# Patient Record
Sex: FEMALE | Race: OTHER
[De-identification: names, ages, dates, MRNs, and addresses within clinical notes are randomized per-mention and may not be internally consistent; named-entity substitution may affect disease eponyms.]

---

## 2023-01-25 ENCOUNTER — HOSPITAL ENCOUNTER (INPATIENT)
Dept: HOSPITAL 15 - ER | Age: 63
LOS: 1 days | Discharge: HOME | DRG: 948 | End: 2023-01-26
Attending: FAMILY MEDICINE | Admitting: NURSE PRACTITIONER
Payer: COMMERCIAL

## 2023-01-25 VITALS — BODY MASS INDEX: 32.43 KG/M2 | HEIGHT: 65 IN | WEIGHT: 194.67 LBS

## 2023-01-25 VITALS — DIASTOLIC BLOOD PRESSURE: 67 MMHG | SYSTOLIC BLOOD PRESSURE: 134 MMHG

## 2023-01-25 DIAGNOSIS — T45.515A: ICD-10-CM

## 2023-01-25 DIAGNOSIS — Z20.822: ICD-10-CM

## 2023-01-25 DIAGNOSIS — R79.9: Primary | ICD-10-CM

## 2023-01-25 DIAGNOSIS — Z79.01: ICD-10-CM

## 2023-01-25 DIAGNOSIS — I10: ICD-10-CM
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
## 2023-01-25 DIAGNOSIS — Z86.711: ICD-10-CM

## 2023-01-25 LAB
ALBUMIN SERPL-MCNC: 3.6 G/DL (ref 3.4–5)
ALP SERPL-CCNC: 81 U/L (ref 45–117)
ALT SERPL-CCNC: 23 U/L (ref 13–56)
ANION GAP SERPL CALCULATED.3IONS-SCNC: 9 MMOL/L (ref 5–15)
BILIRUB SERPL-MCNC: 0.4 MG/DL (ref 0.2–1)
BUN SERPL-MCNC: 20 MG/DL (ref 7–18)
BUN/CREAT SERPL: 16.9
CALCIUM SERPL-MCNC: 9.2 MG/DL (ref 8.5–10.1)
CHLORIDE SERPL-SCNC: 105 MMOL/L (ref 98–107)
CO2 SERPL-SCNC: 28 MMOL/L (ref 21–32)
GLUCOSE SERPL-MCNC: 134 MG/DL (ref 74–106)
HCT VFR BLD AUTO: 44.5 % (ref 36–46)
HGB BLD-MCNC: 14.8 G/DL (ref 12.2–16.2)
INR PPP: 6.8 (ref 0.9–1.15)
MCH RBC QN AUTO: 33.6 PG (ref 28–32)
MCV RBC AUTO: 100.6 FL (ref 80–100)
NRBC BLD QL AUTO: 0.1 %
POTASSIUM SERPL-SCNC: 3.7 MMOL/L (ref 3.5–5.1)
PROT SERPL-MCNC: 7.2 G/DL (ref 6.4–8.2)
SODIUM SERPL-SCNC: 142 MMOL/L (ref 136–145)

## 2023-01-25 PROCEDURE — 85025 COMPLETE CBC W/AUTO DIFF WBC: CPT

## 2023-01-25 PROCEDURE — 85730 THROMBOPLASTIN TIME PARTIAL: CPT

## 2023-01-25 PROCEDURE — 36415 COLL VENOUS BLD VENIPUNCTURE: CPT

## 2023-01-25 PROCEDURE — 87426 SARSCOV CORONAVIRUS AG IA: CPT

## 2023-01-25 PROCEDURE — 85610 PROTHROMBIN TIME: CPT

## 2023-01-25 PROCEDURE — 80053 COMPREHEN METABOLIC PANEL: CPT

## 2023-01-25 PROCEDURE — 80048 BASIC METABOLIC PNL TOTAL CA: CPT

## 2023-01-26 VITALS — SYSTOLIC BLOOD PRESSURE: 135 MMHG | DIASTOLIC BLOOD PRESSURE: 72 MMHG

## 2023-01-26 LAB
ANION GAP SERPL CALCULATED.3IONS-SCNC: 7 MMOL/L (ref 5–15)
APTT PPP: 39.2 SEC (ref 24.6–33.4)
BUN SERPL-MCNC: 22 MG/DL (ref 7–18)
BUN/CREAT SERPL: 19.6
CALCIUM SERPL-MCNC: 9.2 MG/DL (ref 8.5–10.1)
CHLORIDE SERPL-SCNC: 105 MMOL/L (ref 98–107)
CO2 SERPL-SCNC: 29 MMOL/L (ref 21–32)
GLUCOSE SERPL-MCNC: 94 MG/DL (ref 74–106)
HCT VFR BLD AUTO: 46.2 % (ref 36–46)
HGB BLD-MCNC: 15.1 G/DL (ref 12.2–16.2)
INR PPP: 2.12 (ref 0.9–1.15)
MCH RBC QN AUTO: 33.2 PG (ref 28–32)
MCV RBC AUTO: 101.1 FL (ref 80–100)
NRBC BLD QL AUTO: 0.1 %
POTASSIUM SERPL-SCNC: 3.4 MMOL/L (ref 3.5–5.1)
SODIUM SERPL-SCNC: 141 MMOL/L (ref 136–145)

## 2023-01-26 RX ADMIN — ACETAMINOPHEN PRN MG: 325 TABLET ORAL at 01:06

## 2023-01-26 RX ADMIN — ACETAMINOPHEN PRN MG: 325 TABLET ORAL at 09:20

## 2024-10-26 ENCOUNTER — HOSPITAL ENCOUNTER (INPATIENT)
Dept: HOSPITAL 15 - ER | Age: 64
LOS: 2 days | Discharge: HOME | DRG: 202 | End: 2024-10-28
Admitting: NURSE PRACTITIONER
Payer: COMMERCIAL

## 2024-10-26 VITALS
OXYGEN SATURATION: 91 % | TEMPERATURE: 97.7 F | SYSTOLIC BLOOD PRESSURE: 123 MMHG | HEART RATE: 81 BPM | DIASTOLIC BLOOD PRESSURE: 74 MMHG | RESPIRATION RATE: 20 BRPM

## 2024-10-26 VITALS — RESPIRATION RATE: 20 BRPM | OXYGEN SATURATION: 95 % | HEART RATE: 79 BPM

## 2024-10-26 VITALS
SYSTOLIC BLOOD PRESSURE: 138 MMHG | DIASTOLIC BLOOD PRESSURE: 58 MMHG | TEMPERATURE: 98.2 F | RESPIRATION RATE: 19 BRPM | HEART RATE: 96 BPM | OXYGEN SATURATION: 87 %

## 2024-10-26 VITALS — RESPIRATION RATE: 18 BRPM | HEART RATE: 95 BPM | OXYGEN SATURATION: 96 %

## 2024-10-26 VITALS — OXYGEN SATURATION: 98 %

## 2024-10-26 VITALS
TEMPERATURE: 98.1 F | HEART RATE: 95 BPM | OXYGEN SATURATION: 96 % | RESPIRATION RATE: 18 BRPM | DIASTOLIC BLOOD PRESSURE: 72 MMHG | SYSTOLIC BLOOD PRESSURE: 126 MMHG

## 2024-10-26 VITALS — HEIGHT: 65 IN | BODY MASS INDEX: 35.22 KG/M2 | WEIGHT: 211.42 LBS

## 2024-10-26 DIAGNOSIS — N17.9: ICD-10-CM

## 2024-10-26 DIAGNOSIS — Z86.711: ICD-10-CM

## 2024-10-26 DIAGNOSIS — I95.9: ICD-10-CM

## 2024-10-26 DIAGNOSIS — Z79.899: ICD-10-CM

## 2024-10-26 DIAGNOSIS — F32.A: ICD-10-CM

## 2024-10-26 DIAGNOSIS — K21.9: ICD-10-CM

## 2024-10-26 DIAGNOSIS — J45.41: Primary | ICD-10-CM

## 2024-10-26 DIAGNOSIS — I10: ICD-10-CM

## 2024-10-26 DIAGNOSIS — G43.909: ICD-10-CM

## 2024-10-26 LAB
ALBUMIN SERPL-MCNC: 4.1 G/DL (ref 3.2–4.8)
ALP SERPL-CCNC: 72 U/L (ref 46–116)
ALT SERPL-CCNC: 15 U/L (ref 7–40)
ANION GAP SERPL CALCULATED.3IONS-SCNC: 6 MMOL/L (ref 5–15)
BILIRUB SERPL-MCNC: 0.6 MG/DL (ref 0.2–1)
BUN SERPL-MCNC: 11 MG/DL (ref 9–23)
BUN/CREAT SERPL: 10.2 (ref 10–20)
CALCIUM SERPL-MCNC: 10.1 MG/DL (ref 8.7–10.4)
CHLORIDE SERPL-SCNC: 103 MMOL/L (ref 98–107)
CO2 SERPL-SCNC: 30 MMOL/L (ref 20–31)
GLUCOSE SERPL-MCNC: 112 MG/DL (ref 74–106)
HCT VFR BLD AUTO: 42.2 % (ref 36–46)
HGB BLD-MCNC: 14.1 G/DL (ref 12.2–16.2)
MAGNESIUM SERPL-MCNC: 1.8 MG/DL (ref 1.6–2.6)
MCH RBC QN AUTO: 33.2 PG (ref 28–32)
MCV RBC AUTO: 99.6 FL (ref 80–100)
NRBC BLD QL AUTO: 0.1 %
POTASSIUM SERPL-SCNC: 3.6 MMOL/L (ref 3.5–5.1)
PROT SERPL-MCNC: 7.1 G/DL (ref 5.7–8.2)
PROT UR STRIP.AUTO-MCNC: (no result) MG/DL
SODIUM SERPL-SCNC: 139 MMOL/L (ref 136–145)

## 2024-10-26 PROCEDURE — 81001 URINALYSIS AUTO W/SCOPE: CPT

## 2024-10-26 PROCEDURE — 94640 AIRWAY INHALATION TREATMENT: CPT

## 2024-10-26 PROCEDURE — 71046 X-RAY EXAM CHEST 2 VIEWS: CPT

## 2024-10-26 PROCEDURE — 83735 ASSAY OF MAGNESIUM: CPT

## 2024-10-26 PROCEDURE — 36415 COLL VENOUS BLD VENIPUNCTURE: CPT

## 2024-10-26 PROCEDURE — 93005 ELECTROCARDIOGRAM TRACING: CPT

## 2024-10-26 PROCEDURE — 85025 COMPLETE CBC W/AUTO DIFF WBC: CPT

## 2024-10-26 PROCEDURE — 80053 COMPREHEN METABOLIC PANEL: CPT

## 2024-10-26 PROCEDURE — 70450 CT HEAD/BRAIN W/O DYE: CPT

## 2024-10-26 RX ADMIN — KETOROLAC TROMETHAMINE ONE MG: 30 INJECTION, SOLUTION INTRAMUSCULAR; INTRAVENOUS at 10:35

## 2024-10-26 RX ADMIN — METHYLPREDNISOLONE SODIUM SUCCINATE ONE MG: 125 INJECTION, POWDER, FOR SOLUTION INTRAMUSCULAR; INTRAVENOUS at 13:23

## 2024-10-26 RX ADMIN — ALBUTEROL SULFATE ONE MG: 2.5 SOLUTION RESPIRATORY (INHALATION) at 13:28

## 2024-10-26 RX ADMIN — IPRATROPIUM BROMIDE ONE MG: 0.5 SOLUTION RESPIRATORY (INHALATION) at 13:28

## 2024-10-26 RX ADMIN — SODIUM CHLORIDE ONE MLS/HR: 0.9 INJECTION, SOLUTION INTRAVENOUS at 11:20

## 2024-10-26 RX ADMIN — METOCLOPRAMIDE ONE MG: 5 INJECTION, SOLUTION INTRAMUSCULAR; INTRAVENOUS at 10:36

## 2024-10-26 RX ADMIN — METHYLPREDNISOLONE SODIUM SUCCINATE SCH MG: 40 INJECTION, POWDER, FOR SOLUTION INTRAMUSCULAR; INTRAVENOUS at 22:00

## 2024-10-26 RX ADMIN — SODIUM CHLORIDE SCH ML: 9 INJECTION INTRAMUSCULAR; INTRAVENOUS; SUBCUTANEOUS at 22:01

## 2024-10-26 RX ADMIN — HYDROCODONE BITARTRATE AND ACETAMINOPHEN PRN TAB: 5; 325 TABLET ORAL at 22:01

## 2024-10-26 RX ADMIN — SODIUM CHLORIDE ONE MLS/HR: 0.9 INJECTION, SOLUTION INTRAVENOUS at 10:36

## 2024-10-27 VITALS
RESPIRATION RATE: 18 BRPM | HEART RATE: 105 BPM | OXYGEN SATURATION: 92 % | DIASTOLIC BLOOD PRESSURE: 64 MMHG | SYSTOLIC BLOOD PRESSURE: 123 MMHG | TEMPERATURE: 97.9 F

## 2024-10-27 VITALS
TEMPERATURE: 97.8 F | OXYGEN SATURATION: 92 % | RESPIRATION RATE: 17 BRPM | DIASTOLIC BLOOD PRESSURE: 67 MMHG | HEART RATE: 93 BPM | SYSTOLIC BLOOD PRESSURE: 118 MMHG

## 2024-10-27 VITALS — HEART RATE: 99 BPM | OXYGEN SATURATION: 95 % | RESPIRATION RATE: 20 BRPM

## 2024-10-27 VITALS
RESPIRATION RATE: 20 BRPM | OXYGEN SATURATION: 92 % | DIASTOLIC BLOOD PRESSURE: 77 MMHG | HEART RATE: 101 BPM | TEMPERATURE: 98.2 F | SYSTOLIC BLOOD PRESSURE: 127 MMHG

## 2024-10-27 VITALS
OXYGEN SATURATION: 94 % | SYSTOLIC BLOOD PRESSURE: 131 MMHG | TEMPERATURE: 98.4 F | RESPIRATION RATE: 20 BRPM | DIASTOLIC BLOOD PRESSURE: 63 MMHG | HEART RATE: 90 BPM

## 2024-10-27 VITALS — OXYGEN SATURATION: 94 % | HEART RATE: 101 BPM | RESPIRATION RATE: 18 BRPM

## 2024-10-27 VITALS
HEART RATE: 87 BPM | SYSTOLIC BLOOD PRESSURE: 127 MMHG | TEMPERATURE: 98.3 F | OXYGEN SATURATION: 92 % | RESPIRATION RATE: 17 BRPM | DIASTOLIC BLOOD PRESSURE: 69 MMHG

## 2024-10-27 VITALS — HEART RATE: 99 BPM | RESPIRATION RATE: 18 BRPM | OXYGEN SATURATION: 98 %

## 2024-10-27 VITALS
RESPIRATION RATE: 20 BRPM | SYSTOLIC BLOOD PRESSURE: 136 MMHG | DIASTOLIC BLOOD PRESSURE: 71 MMHG | HEART RATE: 101 BPM | OXYGEN SATURATION: 88 % | TEMPERATURE: 98.4 F

## 2024-10-27 VITALS — HEART RATE: 101 BPM | RESPIRATION RATE: 20 BRPM | OXYGEN SATURATION: 96 %

## 2024-10-27 VITALS — OXYGEN SATURATION: 95 %

## 2024-10-27 LAB
ALBUMIN SERPL-MCNC: 4 G/DL (ref 3.2–4.8)
ALP SERPL-CCNC: 67 U/L (ref 46–116)
ALT SERPL-CCNC: 11 U/L (ref 7–40)
ANION GAP SERPL CALCULATED.3IONS-SCNC: 7 MMOL/L (ref 5–15)
BILIRUB SERPL-MCNC: 0.5 MG/DL (ref 0.2–1)
BUN SERPL-MCNC: 19 MG/DL (ref 9–23)
BUN/CREAT SERPL: 18.6 (ref 10–20)
CALCIUM SERPL-MCNC: 9.7 MG/DL (ref 8.7–10.4)
CHLORIDE SERPL-SCNC: 104 MMOL/L (ref 98–107)
CO2 SERPL-SCNC: 28 MMOL/L (ref 20–31)
GLUCOSE SERPL-MCNC: 143 MG/DL (ref 74–106)
HCT VFR BLD AUTO: 41.5 % (ref 36–46)
HGB BLD-MCNC: 13.8 G/DL (ref 12.2–16.2)
MCH RBC QN AUTO: 33.1 PG (ref 28–32)
MCV RBC AUTO: 99.6 FL (ref 80–100)
NRBC BLD QL AUTO: 0.1 %
POTASSIUM SERPL-SCNC: 4.1 MMOL/L (ref 3.5–5.1)
PROT SERPL-MCNC: 6.8 G/DL (ref 5.7–8.2)
SODIUM SERPL-SCNC: 139 MMOL/L (ref 136–145)

## 2024-10-27 RX ADMIN — FAMOTIDINE ONE MG: 20 TABLET, FILM COATED ORAL at 16:11

## 2024-10-27 RX ADMIN — IPRATROPIUM BROMIDE PRN MG: 0.5 SOLUTION RESPIRATORY (INHALATION) at 20:26

## 2024-10-27 RX ADMIN — MONTELUKAST SODIUM SCH MG: 10 TABLET, FILM COATED ORAL at 21:12

## 2024-10-27 RX ADMIN — ENOXAPARIN SODIUM SCH MG: 100 INJECTION SUBCUTANEOUS at 21:14

## 2024-10-27 RX ADMIN — ALBUTEROL SULFATE PRN MG: 2.5 SOLUTION RESPIRATORY (INHALATION) at 20:26

## 2024-10-28 VITALS
SYSTOLIC BLOOD PRESSURE: 133 MMHG | OXYGEN SATURATION: 93 % | RESPIRATION RATE: 19 BRPM | HEART RATE: 98 BPM | TEMPERATURE: 98.1 F | DIASTOLIC BLOOD PRESSURE: 68 MMHG

## 2024-10-28 VITALS — RESPIRATION RATE: 20 BRPM | HEART RATE: 94 BPM | OXYGEN SATURATION: 94 %

## 2024-10-28 VITALS
HEART RATE: 92 BPM | SYSTOLIC BLOOD PRESSURE: 127 MMHG | TEMPERATURE: 97.9 F | DIASTOLIC BLOOD PRESSURE: 84 MMHG | RESPIRATION RATE: 18 BRPM | OXYGEN SATURATION: 94 %

## 2024-10-28 VITALS
HEART RATE: 94 BPM | OXYGEN SATURATION: 92 % | RESPIRATION RATE: 17 BRPM | TEMPERATURE: 98.2 F | DIASTOLIC BLOOD PRESSURE: 66 MMHG | SYSTOLIC BLOOD PRESSURE: 122 MMHG

## 2024-10-28 VITALS
SYSTOLIC BLOOD PRESSURE: 137 MMHG | TEMPERATURE: 98.2 F | OXYGEN SATURATION: 92 % | HEART RATE: 94 BPM | DIASTOLIC BLOOD PRESSURE: 75 MMHG | RESPIRATION RATE: 17 BRPM

## 2024-10-28 VITALS — OXYGEN SATURATION: 96 % | HEART RATE: 101 BPM | RESPIRATION RATE: 18 BRPM

## 2024-10-28 VITALS
OXYGEN SATURATION: 90 % | DIASTOLIC BLOOD PRESSURE: 75 MMHG | SYSTOLIC BLOOD PRESSURE: 137 MMHG | RESPIRATION RATE: 16 BRPM | HEART RATE: 91 BPM | TEMPERATURE: 97.8 F

## 2024-10-28 VITALS — OXYGEN SATURATION: 100 % | RESPIRATION RATE: 18 BRPM | HEART RATE: 88 BPM

## 2024-10-28 VITALS — OXYGEN SATURATION: 95 %

## 2024-10-28 RX ADMIN — FAMOTIDINE SCH MG: 20 TABLET, FILM COATED ORAL at 09:51

## 2024-11-26 ENCOUNTER — HOSPITAL ENCOUNTER (INPATIENT)
Dept: HOSPITAL 15 - ER | Age: 64
LOS: 14 days | Discharge: HOME | DRG: 270 | End: 2024-12-10
Attending: INTERNAL MEDICINE | Admitting: INTERNAL MEDICINE
Payer: COMMERCIAL

## 2024-11-26 VITALS — DIASTOLIC BLOOD PRESSURE: 72 MMHG | OXYGEN SATURATION: 94 % | HEART RATE: 93 BPM | SYSTOLIC BLOOD PRESSURE: 117 MMHG

## 2024-11-26 VITALS — HEART RATE: 95 BPM | RESPIRATION RATE: 23 BRPM | OXYGEN SATURATION: 90 %

## 2024-11-26 VITALS — WEIGHT: 201.72 LBS | HEIGHT: 65 IN | BODY MASS INDEX: 33.61 KG/M2

## 2024-11-26 VITALS — HEART RATE: 85 BPM | OXYGEN SATURATION: 96 % | RESPIRATION RATE: 14 BRPM

## 2024-11-26 VITALS — RESPIRATION RATE: 18 BRPM | OXYGEN SATURATION: 100 %

## 2024-11-26 VITALS — SYSTOLIC BLOOD PRESSURE: 116 MMHG | OXYGEN SATURATION: 97 % | DIASTOLIC BLOOD PRESSURE: 69 MMHG | HEART RATE: 92 BPM

## 2024-11-26 VITALS — SYSTOLIC BLOOD PRESSURE: 111 MMHG | DIASTOLIC BLOOD PRESSURE: 68 MMHG | HEART RATE: 85 BPM | OXYGEN SATURATION: 95 %

## 2024-11-26 VITALS
SYSTOLIC BLOOD PRESSURE: 111 MMHG | HEART RATE: 85 BPM | RESPIRATION RATE: 22 BRPM | OXYGEN SATURATION: 95 % | DIASTOLIC BLOOD PRESSURE: 75 MMHG

## 2024-11-26 DIAGNOSIS — E88.810: ICD-10-CM

## 2024-11-26 DIAGNOSIS — J96.21: ICD-10-CM

## 2024-11-26 DIAGNOSIS — Z20.822: ICD-10-CM

## 2024-11-26 DIAGNOSIS — Z86.711: ICD-10-CM

## 2024-11-26 DIAGNOSIS — N18.9: ICD-10-CM

## 2024-11-26 DIAGNOSIS — F32.A: ICD-10-CM

## 2024-11-26 DIAGNOSIS — E78.5: ICD-10-CM

## 2024-11-26 DIAGNOSIS — I07.1: ICD-10-CM

## 2024-11-26 DIAGNOSIS — J45.901: ICD-10-CM

## 2024-11-26 DIAGNOSIS — D75.89: ICD-10-CM

## 2024-11-26 DIAGNOSIS — I21.A1: ICD-10-CM

## 2024-11-26 DIAGNOSIS — I13.0: Primary | ICD-10-CM

## 2024-11-26 DIAGNOSIS — J98.11: ICD-10-CM

## 2024-11-26 DIAGNOSIS — Z87.891: ICD-10-CM

## 2024-11-26 DIAGNOSIS — Z79.01: ICD-10-CM

## 2024-11-26 DIAGNOSIS — J18.9: ICD-10-CM

## 2024-11-26 DIAGNOSIS — N17.0: ICD-10-CM

## 2024-11-26 DIAGNOSIS — I50.31: ICD-10-CM

## 2024-11-26 DIAGNOSIS — R73.9: ICD-10-CM

## 2024-11-26 DIAGNOSIS — I26.99: ICD-10-CM

## 2024-11-26 DIAGNOSIS — E66.01: ICD-10-CM

## 2024-11-26 DIAGNOSIS — J44.0: ICD-10-CM

## 2024-11-26 LAB
ANION GAP SERPL CALCULATED.3IONS-SCNC: 10 MMOL/L (ref 5–15)
BUN SERPL-MCNC: 29 MG/DL (ref 9–23)
BUN/CREAT SERPL: 16.1 (ref 10–20)
CALCIUM SERPL-MCNC: 9.5 MG/DL (ref 8.7–10.4)
CHLORIDE SERPL-SCNC: 106 MMOL/L (ref 98–107)
CO2 SERPL-SCNC: 26 MMOL/L (ref 20–31)
GLUCOSE SERPL-MCNC: 153 MG/DL (ref 74–106)
HCT VFR BLD AUTO: 43.7 % (ref 36–46)
HGB BLD-MCNC: 14.5 G/DL (ref 12.2–16.2)
MCH RBC QN AUTO: 33.8 PG (ref 28–32)
MCV RBC AUTO: 101.7 FL (ref 80–100)
NRBC BLD QL AUTO: 1.2 %
POTASSIUM SERPL-SCNC: 3.7 MMOL/L (ref 3.5–5.1)
SODIUM SERPL-SCNC: 142 MMOL/L (ref 136–145)

## 2024-11-26 PROCEDURE — 86900 BLOOD TYPING SEROLOGIC ABO: CPT

## 2024-11-26 PROCEDURE — 85007 BL SMEAR W/DIFF WBC COUNT: CPT

## 2024-11-26 PROCEDURE — 97116 GAIT TRAINING THERAPY: CPT

## 2024-11-26 PROCEDURE — 86850 RBC ANTIBODY SCREEN: CPT

## 2024-11-26 PROCEDURE — 97110 THERAPEUTIC EXERCISES: CPT

## 2024-11-26 PROCEDURE — 87804 INFLUENZA ASSAY W/OPTIC: CPT

## 2024-11-26 PROCEDURE — 94640 AIRWAY INHALATION TREATMENT: CPT

## 2024-11-26 PROCEDURE — 83880 ASSAY OF NATRIURETIC PEPTIDE: CPT

## 2024-11-26 PROCEDURE — 36600 WITHDRAWAL OF ARTERIAL BLOOD: CPT

## 2024-11-26 PROCEDURE — 81001 URINALYSIS AUTO W/SCOPE: CPT

## 2024-11-26 PROCEDURE — 71045 X-RAY EXAM CHEST 1 VIEW: CPT

## 2024-11-26 PROCEDURE — 87426 SARSCOV CORONAVIRUS AG IA: CPT

## 2024-11-26 PROCEDURE — 83036 HEMOGLOBIN GLYCOSYLATED A1C: CPT

## 2024-11-26 PROCEDURE — 71275 CT ANGIOGRAPHY CHEST: CPT

## 2024-11-26 PROCEDURE — 93306 TTE W/DOPPLER COMPLETE: CPT

## 2024-11-26 PROCEDURE — 93970 EXTREMITY STUDY: CPT

## 2024-11-26 PROCEDURE — 84484 ASSAY OF TROPONIN QUANT: CPT

## 2024-11-26 PROCEDURE — 83735 ASSAY OF MAGNESIUM: CPT

## 2024-11-26 PROCEDURE — 36415 COLL VENOUS BLD VENIPUNCTURE: CPT

## 2024-11-26 PROCEDURE — 82805 BLOOD GASES W/O2 SATURATION: CPT

## 2024-11-26 PROCEDURE — 87081 CULTURE SCREEN ONLY: CPT

## 2024-11-26 PROCEDURE — 80061 LIPID PANEL: CPT

## 2024-11-26 PROCEDURE — 99291 CRITICAL CARE FIRST HOUR: CPT

## 2024-11-26 PROCEDURE — 84439 ASSAY OF FREE THYROXINE: CPT

## 2024-11-26 PROCEDURE — 80048 BASIC METABOLIC PNL TOTAL CA: CPT

## 2024-11-26 PROCEDURE — 93451 RIGHT HEART CATH: CPT

## 2024-11-26 PROCEDURE — 85025 COMPLETE CBC W/AUTO DIFF WBC: CPT

## 2024-11-26 PROCEDURE — 99152 MOD SED SAME PHYS/QHP 5/>YRS: CPT

## 2024-11-26 PROCEDURE — 85027 COMPLETE CBC AUTOMATED: CPT

## 2024-11-26 PROCEDURE — 80053 COMPREHEN METABOLIC PANEL: CPT

## 2024-11-26 PROCEDURE — 94660 CPAP INITIATION&MGMT: CPT

## 2024-11-26 PROCEDURE — 70450 CT HEAD/BRAIN W/O DYE: CPT

## 2024-11-26 PROCEDURE — 37184 PRIM ART M-THRMBC 1ST VSL: CPT

## 2024-11-26 PROCEDURE — 85610 PROTHROMBIN TIME: CPT

## 2024-11-26 PROCEDURE — 93005 ELECTROCARDIOGRAM TRACING: CPT

## 2024-11-26 PROCEDURE — 97163 PT EVAL HIGH COMPLEX 45 MIN: CPT

## 2024-11-26 PROCEDURE — 82962 GLUCOSE BLOOD TEST: CPT

## 2024-11-26 PROCEDURE — 84443 ASSAY THYROID STIM HORMONE: CPT

## 2024-11-26 PROCEDURE — 93568 NJX CAR CTH NSLC P-ART ANGRP: CPT

## 2024-11-26 PROCEDURE — 85730 THROMBOPLASTIN TIME PARTIAL: CPT

## 2024-11-26 PROCEDURE — 86901 BLOOD TYPING SEROLOGIC RH(D): CPT

## 2024-11-26 RX ADMIN — IPRATROPIUM BROMIDE ONE MG: 0.5 SOLUTION RESPIRATORY (INHALATION) at 15:31

## 2024-11-26 RX ADMIN — Medication SCH STRIP: at 20:20

## 2024-11-26 RX ADMIN — ALBUTEROL SULFATE ONE MG: 2.5 SOLUTION RESPIRATORY (INHALATION) at 15:30

## 2024-11-26 RX ADMIN — AZITHROMYCIN DIHYDRATE ONE MLS/HR: 500 INJECTION, POWDER, LYOPHILIZED, FOR SOLUTION INTRAVENOUS at 19:27

## 2024-11-26 RX ADMIN — HUMAN INSULIN SCH UNITS: 100 INJECTION, SOLUTION SUBCUTANEOUS at 20:00

## 2024-11-26 RX ADMIN — METHYLPREDNISOLONE SODIUM SUCCINATE SCH MG: 40 INJECTION, POWDER, FOR SOLUTION INTRAMUSCULAR; INTRAVENOUS at 22:18

## 2024-11-26 RX ADMIN — ALBUTEROL SULFATE SCH MG: 2.5 SOLUTION RESPIRATORY (INHALATION) at 22:23

## 2024-11-26 RX ADMIN — ATORVASTATIN CALCIUM SCH MG: 20 TABLET, FILM COATED ORAL at 22:18

## 2024-11-26 RX ADMIN — CEFTRIAXONE SODIUM ONE MLS/HR: 1 INJECTION, POWDER, FOR SOLUTION INTRAMUSCULAR; INTRAVENOUS at 19:06

## 2024-11-26 NOTE — DVH
EXAM: CT HEAD WITHOUT CONTRAST



INDICATION: R/O head trauma



TECHNIQUE:  CT of the head without intravenous contrast.



Radiation Dose Information:



CT Dose: CTDI volume is 64.38 mGy. Dose-length product is 1268.55 mGy*cm



The dose indicators for CT are the volume Computed Tomography (CT) Dose Index (CTDIvol) and the Dose 
Length Product (DLP), and are measured in units of mGy and mGy-cm, respectively. These indicators are
 not patient dose, but values generated from the CT scanner acquisition factors. The report includes 
radiation exposure data for exposures received during this examination.  



COMPARISON: CT HEAD WITHOUT CONTRAST on DOS: 10/26/24



FINDINGS:



There is no evidence of acute intracranial hemorrhage, extra-axial collection, mass effect, midline s
hift, herniation or hydrocephalus. Bilateral idiopathic basal ganglion calcifications. These appear u
nchanged from 10/26/2024 



The ventricles, sulci and cisterns are age appropriate.



The gray-white differentiation is intact. 



Patchy periventricular and subcortical white matter hypoattenuation is nonspecific but may be related
 to small vessel ischemic disease.



The visualized paranasal sinuses and mastoid air cells are clear. 



The surrounding soft tissues and osseous structures are unremarkable.



IMPRESSION:



1. No acute intracranial hemorrhage.



2. No CT findings of territorial ischemia.



3. No CT findings of displaced skull fracture.



 



Electronically Signed by: Jorge Vaca at 11/26/2024 20:28:53 PM

## 2024-11-26 NOTE — DVHHP2
History of Present Illness


Reason for Visit:  Shortness of breath


History of Present Illness


Ryan Baptiste is a 65YO F with pmHx of Asthma and HTN who presents to the ED

for shortness of breath. Patient was placed on NRB for sats in the high 80s then

placed on NRB for increased WOB. Patient reports she was camping 5 days ago and 

her shortness of breath occurred before the camping trip in [facility]. Patient's 

 reports that he found her face down on the floor but was conscious. 

Patient reports she is frequently in and out of the hospital for her asthma. 

Patient reports she does not use oxygen at home. She is compliant with her 

medications. Family at the bedside. Patient report her breathing is better. 

Patient denies chest pain, fever, chills, abdominal pain, and weakness.


Cardiovascular:  HTN


Pulmonary:  Asthma


Past Surgical History:  


Family History:  None


Smoke:  No


ALCOHOL:  none


Drugs:  None


Lives:  with Family


Domestic Violence:  Neg





Review of Systems


Constitutional:  No: Fever, Chills, Sweats, Weakness, Malaise, Other


Eyes:  No: Pain, Vision change, Conjunctivae inflammation, Eyelid inflammation, 

Other, Redness


ENT:  No: Ear pain, Ear discharge, Nose pain, Nose discharge, Nose congestion, 

Mouth pain, Mouth swelling, Throat pain, Throat swelling, Other


Respiratory:  Shortness of breath; No: Cough, Dry, SOB with excertion, Wheezing,

Hemoptysis, Pleuritic Pain, Sputum, Wheezing, Other


Cardiovascular:  Other (chest tightness); No: Chest Pain, Palpitations, 

Orthopnea, Paroxysmal Noc. Dyspnea, Edema, Lt Headedness


Gastrointestinal:  No: Nausea, Vomiting, Abdominal Pain, Diarrhea, Constipation,

Melena, Hematochezia, Other


Genitourinary:  No Dysuria, No Frequency, No Incontinence, No Hematuria, No 

Retention, No Other


Musculoskeletal:  No: other, neck pain, shoulder pain, arm pain, back pain, hand

pain, leg pain, foot pain


Skin:  No: Rash, Lesions, Jaundice, Bruising, Other


Neurological:  No: Weakness, Numbness, Incoordination, Change in speech, 

Confusion, Seizures, Other


Allergies:  


Coded Allergies:  


     NO KNOWN ALLERGIES (Unverified , 23)





Exam


Vital Signs





Vital Signs








  Date Time  Temp Pulse Resp B/P (MAP) Pulse Ox O2 Delivery O2 Flow Rate FiO2


 


24 16:59  95 18 125/79 (94) 98   


 


24 15:31      Bi-Pap+  100





        100


 


24 14:55 97.7       





 97.7       








General Appearance:  Alert, Oriented X3, Cooperative, moderate distress


HEENT:  Atraumatic, PERRLA, EOMI, Mucous membr. moist/pink


Respiratory:  Other (Diminished BS)


Cardiovascular:  Regular rate, Normal S1, Normal S2, No murmurs


Abdominal:  Normal bowel sounds, Soft, No tenderness, No hepatospenomegaly, No 

masses


Extremities:  No clubbing, No cyanosis, No edema, Normal pulses, No 

tenderness/swelling


Skin:  No rashes, No breakdown, No significant lesion


Neuro:  Normal gait, Normal speech, Strength at 5/5 X4 ext, Normal tone, 

Sensation intact


Psych/Mental Status:  Mental status NL, Mood NL





Labs/Xrays





                                      Labs








Test


 24


16:40 24


16:21 24


15:25 Range/Units


 


 


Troponin I High Sensitivity 464 *H   </=34  ng/L


 


Blood Gas Specimen Type  Venous    


 


Blood Gas Sample Site  Vbg - n/a    


 


Blood Gas Patient Temperature  37.0    


 


Arterial Blood Date Drawn  [id number]    


 


Erick Test  N/a    


 


Venous Blood pH  7.359   7.320-7.430  


 


Venous Blood pCO2 at Patient


Temp 


 47.9 


 


 38.0-54.0  mmHg





 


Venous Blood pO2 at Patient


Temp 


 54.8 H


 


 23.0-48.0  mmHg





 


Venous Blood HCO3


 


 26.4 


 


 22.0-29.0


mmol/L


 


Venous Blood Base Excess


 


 0.3 


 


 -2.0-3.0


mmol/L


 


Blood Gas Set Respiration Rate  12.0    


 


Blood Gas Modality  Mask - bipap    


 


FiO2 %  100.0    


 


Blood Gas Pressure Support  7    


 


Blood Gas EPAP  5    


 


Blood Gas IPAP  12    


 


White Blood Count


 


 


 5.8 


 4.4-10.8


10^3/uL


 


Red Blood Count


 


 


 4.30 


 4.0-5.20


10^6/uL


 


Hemoglobin   14.5  12.2-16.2  g/dL


 


Hematocrit   43.7  36.0-46.0  %


 


Mean Corpuscular Volume   101.7 H 80.0-100.0  fL


 


Mean Corpuscular Hemoglobin   33.8 H 28.0-32.0  pg


 


Mean Corpuscular Hemoglobin


Concent 


 


 33.2 


 32.0-36.0  g/dL





 


Red Cell Distribution Width   15.1 H 11.8-14.3  %


 


Platelet Count


 


 


 245 


 140-450


10^3/uL


 


Mean Platelet Volume   8.4  6.9-10.8  fL


 


Neutrophils (%) (Auto)   73.0  37.0-80.0  %


 


Lymphocytes (%) (Auto)   16.2  10.0-50.0  %


 


Monocytes (%) (Auto)   9.8  0.0-12.0  %


 


Eosinophils (%) (Auto)   0.4  0.0-7.0  %


 


Basophils (%) (Auto)   0.6  0.0-2.0  %


 


Neutrophils # (Auto)


 


 


 4.3 


 1.6-8.6  10


^3/uL


 


Lymphocytes # (Auto)


 


 


 0.9 


 0.4-5.4  10


^3/uL


 


Monocytes # (Auto)   0.6  0-1.3  10 ^3/uL


 


Eosinophils # (Auto)   0  0-0.8  10 ^3/uL


 


Basophils # (Auto)   0  0-0.2  10 ^3/uL


 


Nucleated Red Blood Cells   1.2   %


 


Sodium Level   142  136-145  mmol/L


 


Potassium Level   3.7  3.5-5.1  mmol/L


 


Chloride Level   106    mmol/L


 


Carbon Dioxide Level   26  20-31  mmol/L


 


Anion Gap   10  5-15  


 


Blood Urea Nitrogen   29 H 9-23  mg/dL


 


Creatinine


 


 


 1.80 H


 0.550-1.02


mg/dL


 


Glomerular Filtration Rate


Calc 


 


 31 


 >90  mL/min





 


BUN/Creatinine Ratio   16.1  10.0-20.0  


 


Serum Glucose   153 H   mg/dL


 


Calcium Level   9.5  8.7-10.4  mg/dL


 


B-Type Natriuretic Peptide   988.20  0-100  pg/mL














CHEST RADIOGRAPH





Indication: sob





FINDINGS:





Lines and Tubes: None





Lungs: No focal consolidation. Prominent bilateral ludivina. Hyperinflation of the 

lungs.





Pleura: No effusion.





No pneumothorax. 





Cardiomediastinal contours: Unremarkable





Bones: No acute osseous abnormality.





IMPRESSION: 





1. Prominent bilateral ludivina may be related to pulmonary vascular congestion or 

atypical infection. Hyperinflation of the lungs.





Assessment/Plan


Assessment/Plan


Assessment:


Acute hypoxic respiratory failure 2nd to PNA


Acute on chronic kidney disease


Hx: Asthma


HTN


preDM


PE 








Plan:


Admit to [facility]


Cardiology consulted - Dr. Alhejily aware 


Bipap


Resp txs


IV Abx


IVf


IV steroids


Pain management


Trend troponin's


CXR noted


ECHO 


CT Head


Home medications reconciled


Discussed plan of care with patient, patient's family and nurse


Plan discussed with:  Patient


Problem List:  


(1) Acute hypoxic respiratory failure


(2) Elevated troponin


(3) Asthma exacerbation





Date of Service:  2024


Billing Provider:  ELISSA CH


Common Visit Codes:  99222-INITIAL INP/OBS CARE (MOD)











ELISSA CH              2024 17:40

## 2024-11-26 NOTE — DVH
CHEST RADIOGRAPH



Indication: sob



Technique: Single frontal view of the chest was obtained



Comparison: None



FINDINGS:



Lines and Tubes: None



Lungs: No focal consolidation. Prominent bilateral ludivina. Hyperinflation of the lungs.



Pleura: No effusion.



No pneumothorax. 



Cardiomediastinal contours: Unremarkable



Bones: No acute osseous abnormality.



IMPRESSION: 



1. Prominent bilateral ludivina may be related to pulmonary vascular congestion or atypical infection. Hy
perinflation of the lungs.



HS:Y



Electronically Signed by: Pablito Butts at 11/26/2024 15:19:33 PM

## 2024-11-26 NOTE — ED.PDOC
SOB-HPI


HPI Comments


64-year-old female with PMHx Asthma brought in by EMS presents with a chief 

complaint of SOB. Patient was sating at 87% on room air and was placed on 20L 

via non-rebreather by EMS. Patient denies any wheezing, coughing, or hemoptysis.

Patient is able to speak in full, complete sentences. Patient denies chest pain,

nausea, vomiting, diarrhea, or headache. No other symptoms or modifying factors 

present at this time.


Chief Complaint:  Shortness of Breath


Time Seen by MD:  14:42


Primary Care Provider:  Erik at [facility]


Reviewed notes:  Medications, Allergies


Information Source:  Patient, Emergency Med Personnel


Mode of Arrival:  EMS


Severity:  Moderate


Timing:  Minutes


Duration:  Since onset


Context:  At Rest


PE Risk Factors:  None


History of:  Asthma


Prehospital treatment:  Oxygen


Radiation:  No Radiation


If cough with SOB:  Non-Productive





Past Medical History


PAST MEDICAL HISTORY:  Asthma, HTN


Surgical History:  


GYN History:  No Pertinent GYN History





Family History


Family History:  Reviewed,noncontributory to illness, Unknown





Social History


Smoker:  Non-Smoker


Alcohol:  Denies ETOH Use


Drugs:  Denies Drug Use


Lives In:  Home





Constitutional:  denies: chills, diaphoresis, fatigue, fever, malaise, sweats, 

weakness, others


EENTM:  denies: blurred vision, double vision, ear bleeding, ear discharge, ear 

drainage, ear pain, ear ringing, eye pain, eye redness, hearing loss, mouth 

pain, mouth swelling, nasal discharge, nose bleeding, nose congestion, nose 

pain, photophobia, tearing, throat pain, throat swelling, voice changes, others


Respiratory:  reports: SOB at rest, shortness of breath; denies: cough, 

hemoptysis, orthopnea, SOB with excertion, stridor, wheezing, others


Cardiovascular:  denies: chest pain, dizzy spells, diaphoresis, Dyspnea on 

exertion, edema, irregular heart beat, left arm pain, lightheadedness, 

palpitations, PND, syncope, others


Gastrointestinal:  denies: abdomen distended, abdominal pain, blood streaked 

bowels, constipated, diarrhea, dysphagia, difficulty swallowing, hematemesis, 

melena, nausea, poor appetite, poor fluid intake, rectal bleeding, rectal pain, 

vomiting, others


Genitourinary:  denies: abnormal vagina bleeding, burning, dyspareunia, dysuria,

flank pain, frequency, hematuria, incontinence, pain, pregnant, vagina 

discharge, urgency, others


Neurological:  denies: dizziness, fainting, headache, left sided numbness, left 

sided weakness, numbness, paresthesia, pre-existing deficit, right sided 

numbness, right sided weakness, seizure, speech problems, tingling, tremors, 

weakness, others


Musculoskeletal:  denies: back pain, gout, joint pain, joint swelling, muscle 

pain, muscle stiffness, neck pain, others


Integumetry:  denies: bruises, change in color, change in hair/nails, dryness, 

laceration, lesions, lumps, rash, wounds, others


Allergic/Immunocompromised:  denies: Difficulty Healing, Frequent Infections, H

genna, Itching, others


Hematologic/Lymphatic:  denies: anemia, blood clots, easy bleeding, easy 

bruising, swollen glands, others


Endocrine:  denies: excessive hunger, excessive sweating, excessive thirst, 

excessive urination, flushing, intolerance to cold, intolerance to heat, 

unexplained weight gain, unexplained weight loss, others


Psychiatric:  denies: anxiety, bipolar disorder, depression, hopeless, panic 

disorder, schizophrenia, sleepless, suicidal, others


All Other Systems:  Reviewed and Negative





Physical Exam


General Appearance:  No Apparent Distress, Normal


HEENT:  Normal ENT Inspection, Pharynx Normal, TMs Normal


Neck:  Full Range of Motion, Non-Tender, Normal, Normal Inspection


Respiratory:  Chest Non-Tender, Lungs Clear, No Accessory Muscle Use, No 

Respiratory Distress, Normal Breath Sounds


Cardiovascular:  No Edema, No JVD, No Murmur, No Gallop, Normal Peripheral 

Pulses, Regular Rate/Rhythm


Breast Exam:  Deferred


Gastrointestinal:  No Organomegaly, Non Tender, No Pulsatile Mass, Normal Bowel 

Sounds, Soft


Genitalia:  Deferred


Pelvic:  Deferred


Rectal:  Deferred


Extremities:  No calf tenderness, Normal capillary refill, Normal inspection, 

Normal range of motion, Non-tender, No pedal edema


Musculoskeletal :  


   Apperance:  Normal


Neurologic:  Alert, CNs II-XII nml as Tested, No Motor Deficits, Normal Affect, 

Normal Mood, No Sensory Deficits


Cerebellar Function:  Normal


Reflexes:  Normal


Skin:  Dry, Normal Color, Warm


Lymphatic:  No Adenopathy





Was a procedure done?


Was a procedure done?:  No





Differential Dx


Differential Diagnosis:  CHF, COPD, Hypertension, Pneumonia, Pulmonary Embolism,

URI





X-Ray, Labs, Meds, VS





                                   Vital Signs








  Date Time  Temp Pulse Resp B/P (MAP) Pulse Ox O2 Delivery O2 Flow Rate FiO2


 


24 16:59  95 18 125/79 (94) 98   


 


24 16:00  94      


 


24 15:31   30  98 Bi-Pap+  100





        100


 


24 15:06   18  100 Bi-Pap+  100





        100


 


24 15:05     99 Bi-Pap+  100





        100


 


24 14:55 97.7 102 22 118/75 (89) 99   





 97.7       


 


24 14:53  98      


 


24 14:50  104  118/75  Facial BiPAP Mask  100








                                       Lab








Test


 24


16:40 24


16:21 24


15:25 Range/Units


 


 


Troponin I High Sensitivity Pending   433 *H </=34  ng/L


 


Blood Gas Specimen Type  Venous    


 


Blood Gas Sample Site  Vbg - n/a    


 


Blood Gas Patient Temperature  37.0    


 


Arterial Blood Date Drawn  [id number]    


 


Erick Test  N/a    


 


Venous Blood pH  7.359   7.320-7.430  


 


Venous Blood pCO2 at Patient


Temp 


 47.9 


 


 38.0-54.0  mmHg





 


Venous Blood pO2 at Patient


Temp 


 54.8 H


 


 23.0-48.0  mmHg





 


Venous Blood HCO3


 


 26.4 


 


 22.0-29.0


mmol/L


 


Venous Blood Base Excess


 


 0.3 


 


 -2.0-3.0


mmol/L


 


Blood Gas Set Respiration Rate  12.0    


 


Blood Gas Modality  Mask - bipap    


 


FiO2 %  100.0    


 


Blood Gas Pressure Support  7    


 


Blood Gas EPAP  5    


 


Blood Gas IPAP  12    


 


White Blood Count


 


 


 5.8 


 4.4-10.8


10^3/uL


 


Red Blood Count


 


 


 4.30 


 4.0-5.20


10^6/uL


 


Hemoglobin   14.5  12.2-16.2  g/dL


 


Hematocrit   43.7  36.0-46.0  %


 


Mean Corpuscular Volume   101.7 H 80.0-100.0  fL


 


Mean Corpuscular Hemoglobin   33.8 H 28.0-32.0  pg


 


Mean Corpuscular Hemoglobin


Concent 


 


 33.2 


 32.0-36.0  g/dL





 


Red Cell Distribution Width   15.1 H 11.8-14.3  %


 


Platelet Count


 


 


 245 


 140-450


10^3/uL


 


Mean Platelet Volume   8.4  6.9-10.8  fL


 


Neutrophils (%) (Auto)   73.0  37.0-80.0  %


 


Lymphocytes (%) (Auto)   16.2  10.0-50.0  %


 


Monocytes (%) (Auto)   9.8  0.0-12.0  %


 


Eosinophils (%) (Auto)   0.4  0.0-7.0  %


 


Basophils (%) (Auto)   0.6  0.0-2.0  %


 


Neutrophils # (Auto)


 


 


 4.3 


 1.6-8.6  10


^3/uL


 


Lymphocytes # (Auto)


 


 


 0.9 


 0.4-5.4  10


^3/uL


 


Monocytes # (Auto)   0.6  0-1.3  10 ^3/uL


 


Eosinophils # (Auto)   0  0-0.8  10 ^3/uL


 


Basophils # (Auto)   0  0-0.2  10 ^3/uL


 


Nucleated Red Blood Cells   1.2   %


 


Sodium Level   142  136-145  mmol/L


 


Potassium Level   3.7  3.5-5.1  mmol/L


 


Chloride Level   106    mmol/L


 


Carbon Dioxide Level   26  20-31  mmol/L


 


Anion Gap   10  5-15  


 


Blood Urea Nitrogen   29 H 9-23  mg/dL


 


Creatinine


 


 


 1.80 H


 0.550-1.02


mg/dL


 


Glomerular Filtration Rate


Calc 


 


 31 


 >90  mL/min





 


BUN/Creatinine Ratio   16.1  10.0-20.0  


 


Serum Glucose   153 H   mg/dL


 


Calcium Level   9.5  8.7-10.4  mg/dL


 


B-Type Natriuretic Peptide   988.20  0-100  pg/mL








                               Current Medications








 Medications


  (Trade)  Dose


 Ordered  Sig/Brian


 Route  Start Time


 Stop Time Status Last Admin


 


 Albuterol


  (Ventolin Medneb)  5 mg  ONCE  ONCE


 NEB  24 15:15


 24 15:16 DC 24 15:30





 


 Ipratropium


 Bromide


  (Atrovent Medneb)  0.5 mg  ONCE  ONCE


 NEB  24 15:15


 24 15:16 DC 24 15:31











Time of 1ST Reevaluation:  15:12


Reevaluation 1ST:  Unchanged


Patient Education/Counseling:  Diagnosis, Treatment, Prognosis


Family Education/Counseling:  No Family Present





Departure 1


Departure


Time of Disposition:  17:15 (Patient presented with acute shortness of breath 

concerning for acute on chronic COPD Exacerbation, Pneumonia, ACS, CHF, 

Pneumothorax. Less likely PE, Dissection.  Data: 1. I ordered and reviewed the 

result of at least 3 labs including a CBC, BMP, and Troponin. 2. I independently

interpreted the following tests: Chest X-ray shows ______________ .Risk:This 

patient has a high risk of morbidity due to further diagnostic testing or 

treatment and may suffer from respiratory or cardiac etiology . Workup reveals a

likely COPD Exacerbation and patient should be admitted for further workup. and 

possible expert consultation.)


Impression:  


   Primary Impression:  


   Acute hypoxic respiratory failure


   Additional Impressions:  


   COPD (chronic obstructive pulmonary disease)


   Qualified Codes:  J44.1 - Chronic obstructive pulmonary disease with (acute) 

   exacerbation


   Cough


   Qualified Codes:  R05.1 - Acute cough


Disposition:  09 ADMITTED AS INPATIENT


Admit to:  [facility]


Condition:  Guarded





Critical Care Note


Critical Care Time?:  Yes


Critical care comment:


Acute hypoxic respiratory failure


Authorized and Performed by: Wayne Saldana MD


Total critical care time: Approximately 33 minutes


Due to a high probability of clinically significant, life threatening 

deterioration, the patient required my highest level of preparedness to 

intervene emergently and I personally spent this critical care time directly and

personally managing the patient. This critical care time included obtaining a 

history; examining the patient; pulse oximetry; ordering and review of studies; 

arranging urgent treatment with development of a management plan; evaluation of 

patient's response to treatment; frequent reassessment; and, discussions with 

other providers.


This critical care time was performed to assess and manage the high probability 

of imminent, life-threatening deterioration that could result in multi-organ 

failure. It was exclusive of separately billable procedures and treating other 

patients and teaching time.


Please see my other sections and the rest of the note for further information on

patient assessment and treatment.





Stability


Stability form required:  No





Heart Score


Heart Score:  








Heart Score Response (Comments) Value


 


History Slightly Suspicious 0


 


EKG Normal 0


 


Age >65 2


 


Risk Factors >3 or Hx ASHD 2


 


Troponin >3 x's Normal limit 2


 


Total  6














I personally scribed for WAYNE SALDANA MD ([email or web address]) on 24 at 14:58.  

Electronically submitted by John Garcia ([email or web address]).





WAYNE SALDANA MD               2024 14:58

## 2024-11-27 VITALS — RESPIRATION RATE: 17 BRPM | OXYGEN SATURATION: 98 % | HEART RATE: 95 BPM

## 2024-11-27 VITALS
HEART RATE: 101 BPM | OXYGEN SATURATION: 93 % | TEMPERATURE: 98.5 F | SYSTOLIC BLOOD PRESSURE: 116 MMHG | RESPIRATION RATE: 14 BRPM | DIASTOLIC BLOOD PRESSURE: 71 MMHG

## 2024-11-27 VITALS
OXYGEN SATURATION: 90 % | DIASTOLIC BLOOD PRESSURE: 51 MMHG | HEART RATE: 103 BPM | SYSTOLIC BLOOD PRESSURE: 122 MMHG | RESPIRATION RATE: 18 BRPM

## 2024-11-27 VITALS
TEMPERATURE: 98.3 F | DIASTOLIC BLOOD PRESSURE: 71 MMHG | RESPIRATION RATE: 14 BRPM | OXYGEN SATURATION: 95 % | SYSTOLIC BLOOD PRESSURE: 116 MMHG | HEART RATE: 96 BPM

## 2024-11-27 VITALS
HEART RATE: 95 BPM | RESPIRATION RATE: 13 BRPM | SYSTOLIC BLOOD PRESSURE: 109 MMHG | OXYGEN SATURATION: 95 % | DIASTOLIC BLOOD PRESSURE: 70 MMHG

## 2024-11-27 VITALS
HEART RATE: 99 BPM | SYSTOLIC BLOOD PRESSURE: 120 MMHG | DIASTOLIC BLOOD PRESSURE: 77 MMHG | RESPIRATION RATE: 18 BRPM | OXYGEN SATURATION: 93 %

## 2024-11-27 VITALS
HEART RATE: 101 BPM | RESPIRATION RATE: 19 BRPM | SYSTOLIC BLOOD PRESSURE: 120 MMHG | DIASTOLIC BLOOD PRESSURE: 75 MMHG | OXYGEN SATURATION: 93 %

## 2024-11-27 VITALS
RESPIRATION RATE: 12 BRPM | OXYGEN SATURATION: 94 % | SYSTOLIC BLOOD PRESSURE: 130 MMHG | HEART RATE: 96 BPM | DIASTOLIC BLOOD PRESSURE: 78 MMHG

## 2024-11-27 VITALS — HEART RATE: 96 BPM | RESPIRATION RATE: 20 BRPM | OXYGEN SATURATION: 90 %

## 2024-11-27 VITALS — HEART RATE: 85 BPM | DIASTOLIC BLOOD PRESSURE: 66 MMHG | OXYGEN SATURATION: 94 % | SYSTOLIC BLOOD PRESSURE: 100 MMHG

## 2024-11-27 VITALS — HEART RATE: 99 BPM | RESPIRATION RATE: 18 BRPM | OXYGEN SATURATION: 91 %

## 2024-11-27 VITALS — OXYGEN SATURATION: 93 % | DIASTOLIC BLOOD PRESSURE: 62 MMHG | HEART RATE: 90 BPM | SYSTOLIC BLOOD PRESSURE: 111 MMHG

## 2024-11-27 VITALS — HEART RATE: 87 BPM | SYSTOLIC BLOOD PRESSURE: 114 MMHG | DIASTOLIC BLOOD PRESSURE: 75 MMHG | OXYGEN SATURATION: 93 %

## 2024-11-27 VITALS — HEART RATE: 96 BPM | DIASTOLIC BLOOD PRESSURE: 72 MMHG | SYSTOLIC BLOOD PRESSURE: 103 MMHG | OXYGEN SATURATION: 93 %

## 2024-11-27 VITALS — RESPIRATION RATE: 14 BRPM | HEART RATE: 95 BPM | OXYGEN SATURATION: 96 %

## 2024-11-27 VITALS
DIASTOLIC BLOOD PRESSURE: 75 MMHG | RESPIRATION RATE: 19 BRPM | OXYGEN SATURATION: 95 % | HEART RATE: 99 BPM | SYSTOLIC BLOOD PRESSURE: 128 MMHG

## 2024-11-27 VITALS
RESPIRATION RATE: 12 BRPM | DIASTOLIC BLOOD PRESSURE: 77 MMHG | HEART RATE: 91 BPM | SYSTOLIC BLOOD PRESSURE: 116 MMHG | OXYGEN SATURATION: 94 %

## 2024-11-27 LAB
ALBUMIN SERPL-MCNC: 3.9 G/DL (ref 3.2–4.8)
ALP SERPL-CCNC: 73 U/L (ref 46–116)
ALT SERPL-CCNC: 22 U/L (ref 7–40)
ANION GAP SERPL CALCULATED.3IONS-SCNC: 9 MMOL/L (ref 5–15)
APTT PPP: 25.9 SEC (ref 24.5–34.5)
APTT PPP: 27.6 SEC (ref 24.5–34.5)
BASE EXCESS BLDV CALC-SCNC: 3.3 MMOL/L (ref -2–3)
BILIRUB SERPL-MCNC: 0.5 MG/DL (ref 0.2–1)
BUN SERPL-MCNC: 24 MG/DL (ref 9–23)
BUN/CREAT SERPL: 18.9 (ref 10–20)
CALCIUM SERPL-MCNC: 9.8 MG/DL (ref 8.7–10.4)
CELLS COUNTED: 100
CHLORIDE SERPL-SCNC: 104 MMOL/L (ref 98–107)
CHOLEST SERPL-MCNC: 195 MG/DL (ref ?–200)
CO2 SERPL-SCNC: 27 MMOL/L (ref 20–31)
GLUCOSE SERPL-MCNC: 145 MG/DL (ref 74–106)
HCT VFR BLD AUTO: 42.8 % (ref 36–46)
HCT VFR BLD AUTO: 43.3 % (ref 36–46)
HDLC SERPL-MCNC: 80 MG/DL (ref 40–59)
HGB BLD-MCNC: 14.7 G/DL (ref 12.2–16.2)
HGB BLD-MCNC: 14.8 G/DL (ref 12.2–16.2)
INR PPP: 1.01 (ref 0.9–1.15)
INR PPP: 1.03 (ref 0.9–1.15)
MAGNESIUM SERPL-MCNC: 1.8 MG/DL (ref 1.6–2.6)
MCH RBC QN AUTO: 34.4 PG (ref 28–32)
MCH RBC QN AUTO: 34.5 PG (ref 28–32)
MCV RBC AUTO: 100.5 FL (ref 80–100)
MCV RBC AUTO: 100.9 FL (ref 80–100)
NRBC BLD QL AUTO: 0.6 %
NRBC BLD QL AUTO: 3 %
POTASSIUM SERPL-SCNC: 3.9 MMOL/L (ref 3.5–5.1)
PROT SERPL-MCNC: 6.1 G/DL (ref 5.7–8.2)
PROTHROMBIN TIME: 10.7 SEC (ref 9.3–11.8)
PROTHROMBIN TIME: 10.9 SEC (ref 9.3–11.8)
SARS-COV+SARS-COV-2 AG RESP QL IA.RAPID: NEGATIVE
SODIUM SERPL-SCNC: 140 MMOL/L (ref 136–145)
TRIGL SERPL-MCNC: 96 MG/DL (ref ?–150)

## 2024-11-27 PROCEDURE — 5A0935A ASSISTANCE WITH RESPIRATORY VENTILATION, LESS THAN 24 CONSECUTIVE HOURS, HIGH NASAL FLOW/VELOCITY: ICD-10-PCS | Performed by: INTERNAL MEDICINE

## 2024-11-27 RX ADMIN — DEXTROSE ONE UNITS: 5 SOLUTION INTRAVENOUS at 19:42

## 2024-11-27 RX ADMIN — HEPARIN SODIUM AND DEXTROSE SCH MLS/HR: 10000; 5 INJECTION INTRAVENOUS at 11:56

## 2024-11-27 RX ADMIN — HEPARIN SODIUM AND DEXTROSE SCH MLS/HR: 10000; 5 INJECTION INTRAVENOUS at 19:42

## 2024-11-27 RX ADMIN — DOCUSATE SODIUM SCH MG: 100 CAPSULE, LIQUID FILLED ORAL at 10:50

## 2024-11-27 RX ADMIN — CEFTRIAXONE SODIUM SCH MLS/HR: 1 INJECTION, POWDER, FOR SOLUTION INTRAMUSCULAR; INTRAVENOUS at 09:42

## 2024-11-27 RX ADMIN — DEXTROSE ONE UNITS: 5 SOLUTION INTRAVENOUS at 11:54

## 2024-11-27 RX ADMIN — DEXTROSE ONE UNITS: 5 SOLUTION INTRAVENOUS at 10:45

## 2024-11-27 RX ADMIN — IOHEXOL ONE MG: 350 INJECTION, SOLUTION INTRAVENOUS at 08:40

## 2024-11-27 RX ADMIN — MAGNESIUM SULFATE IN DEXTROSE ONE MLS/HR: 10 INJECTION, SOLUTION INTRAVENOUS at 08:49

## 2024-11-27 RX ADMIN — AZITHROMYCIN DIHYDRATE SCH MLS/HR: 500 INJECTION, POWDER, LYOPHILIZED, FOR SOLUTION INTRAVENOUS at 13:05

## 2024-11-27 NOTE — DVHPN2
Subjective


Continue to complain of SOB and chest tightness


Reviewed:  Care Plan, H&P, Labs, Medications, Previous Orders, Radiology, Other 

(Consultation)


Changes from previous H/P or p:  No Changes





Objective


Vitals





Vital Signs








  Date Time  Temp Pulse Resp B/P (MAP) Pulse Ox O2 Delivery O2 Flow Rate FiO2


 


11/27/24 11:00  99 17 118/77 (91) 94   


 


11/27/24 10:30       70.0 80


 


11/27/24 07:18 96.8       





 96.8       


 


11/27/24 07:18      Bi-Pap+  








Intake/Output











                               Intake and Output 


 


 11/27/24





 07:00


 


Intake Total 50 ml


 


Balance 50 ml


 


 


 


Intake IV Total 50 ml








General Appearance:  Alert, Oriented X3, Cooperative, moderate distress


HEENT:  Atraumatic


Lungs:  Other (Decreased air entry bilateral)


Cardiovascular:  Normal S1, Normal S2, Other (Tachycardia)


Abdomen:  Normal bowel sounds, Soft, No tenderness


Neuro:  Normal speech, Cranial nerves 3-12 NL


Psych/Mental Status:  Mental status NL, Mood NL


Medications





                               Current Medications








 Medications  Dose


 Ordered  Sig/Brian


 Route  Start Time


 Stop Time Status Last Admin


Dose Admin


 


 Diagnostic Test


  (Pha)  1 strip  IQ4HR


 VI  11/26/24 20:00


    11/27/24 12:23


1 STRIP


 


 Insulin Human


 Regular    IQ4HR


 SC  11/26/24 20:00


    11/27/24 12:26


3 UNITS


 


 Dextrose  50 ml  UD  PRN


 IV  11/26/24 18:15


     





 


 Aspirin  81 mg  DAILY


 PO  11/27/24 10:00


    11/27/24 10:51


81 MG


 


 Atorvastatin


 Calcium  40 mg  HS


 PO  11/26/24 22:00


    11/26/24 22:18


40 MG


 


 Morphine Sulfate  2 mg  Q30MP  PRN


 IV  11/26/24 18:15


     





 


 Acetaminophen  650 mg  Q6HP  PRN


 PO  11/26/24 18:15


     





 


 Docusate Sodium  100 mg  DAILY


 PO  11/27/24 10:00


    11/27/24 10:50


100 MG


 


 Ondansetron HCl  4 mg  Q4HP  PRN


 IV  11/26/24 18:15


     





 


 Nitroglycerin  0.4 mg  Q5MINP  PRN


 SL  11/26/24 18:15


     





 


 Albuterol  2.5 mg  Q4HR


 NEB  11/26/24 22:00


    11/27/24 11:27


2.5 MG


 


 Methylprednisolone


 Sodium Succinate  40 mg  BID


 IV  11/26/24 22:00


    11/27/24 10:51


40 MG


 


 Ceftriaxone Sodium  50 ml @ 


 100 mls/hr  DAILY@09


 IV  11/27/24 09:00


    11/27/24 09:42


100 MLS/HR


 


 Azithromycin  250 ml @ 


 125 mls/hr  DAILY


 IV  11/27/24 10:00


    11/27/24 13:05


125 MLS/HR


 


 Heparin Sodium/


 Dextrose  250 ml @ 


 13 mls/hr  [id number]


 IV  11/27/24 12:00


    11/27/24 11:56


13 MLS/HR











Laboratory Results


Laboratory Tests


11/27/24 03:51








11/27/24 10:49











Chemistry








Test


 11/26/24


15:25 11/27/24


03:51


 


Calcium Level


 9.5 mg/dL


(8.7-10.4) 9.8 mg/dL


(8.7-10.4)


 


Albumin


 


 3.9 g/dL


(3.2-4.8)


 


Magnesium Level


 


 1.8 mg/dL


(1.6-2.6)


 


Total Protein


 


 6.1 g/dL


(5.7-8.2)








Coagulation








Test


 11/27/24


10:49


 


Prothrombin Time


 10.9 sec


(9.3-11.8)


 


Prothrombin Time INR


 1.03


(0.9-1.15)


 


Activated Partial


Thromboplast Time 27.6 SEC


(24.5-34.5)








Lipid panel








Test


 11/27/24


03:51


 


Cholesterol Level


 195 mg/dL (<


200)


 


HDL Cholesterol


 80 mg/dL


(40-59)  H


 


Triglycerides Level


 96 mg/dL (<


150)








Cardiac Markers








Test


 11/26/24


15:25 11/27/24


03:51


 


B-Type Natriuretic Peptide


 988.20 pg/mL


(0-100) 747.58 pg/mL


(0-100)








LFT








Test


 11/27/24


03:51


 


Alanine Aminotransferase (ALT) 22 U/L (7-40)  


 


Alkaline Phosphatase


 73 U/L


()


 


Aspartate Amino Transferase


(AST) 20 U/L (13-40)





 


Total Bilirubin


 0.5 mg/dL


(0.2-1.0)








HgA1c, TSH








Test


 11/27/24


03:51


 


Hemoglobin A1c


 6.1 % A1C


(<5.7)  H


 


Thyroid Stimulating Hormone


(TSH) 0.42 uIU/mL


(0.55-4.78)  L








Blood Gas Results








Test


 11/26/24


16:21 11/27/24


06:20


 


FiO2 % 100.0   72.0  


 


Arterial Blood pH


 


 7.388


(7.350-7.450)








Labs and/or images reviewed:  Labs reviewed by me, Image(s) reviewed by me





Assessment/Plan


Assessment/Plan


A 64-year-old female patient; with multiple comorbidities; who presented to 

emergency department with SOB and chest tightness.








#Acute hypoxic respiratory failure due to bilateral pulmonary emboli; reviewed 

the available imaging studies; continue oxygen therapy as needed; now on high-

flow nasal cannula; was on BiPAP; continue monitoring


#Submassive bilateral pulmonary emboli with RV strain in the setting of history 

of pulmonary embolism 2009; causing SOB and chest tightness; stopped Xarelto 

last month; started on heparin infusion; cardiology is following; reviewed chest

angiogram and echocardiogram; no DVTs; continue monitoring


#Chest tightness with NSTEMI type 2; demand ischemia; the setting of RV strain 

secondary to submassive bilateral pulmonary emboli; telemetry; continue aspirin 

and statin; cardiology is following; continue monitoring


#Essential hypertension; continue antihypertensive medications as indicated; 

continue monitoring


#Hyperglycemia; prediabetic; continue insulin sliding scale and hypoglycemia 

protocol; continue monitoring


#Asthma exacerbation; continue IV steroids with IV antibiotics; continue 

monitoring


#Metabolic syndrome with prediabetes and dyslipidemia along with 

overweight/obesity; counseled the patient on the importance of adopting healthy 

lifestyle with diet and exercise in order to lose weight


#Dyslipidemia; continue statin; reviewed lipid profile; continue monitoring


#Prediabetes; hemoglobin A1c of 6.1; management as above; continue monitoring


#GOPAL; most likely vasomotor nephropathy in the setting of submassive bilateral 

pulmonary emboli and RV strain; avoid nephrotoxic agents; continue monitoring


#Macrocytosis without anemia; to investigate as outpatient; continue monitoring


#Low TSH; ordered free T4; continue monitoring








Goals of care discussed for 20 minutes; full code





120 minutes of critical care time





Late entry








This medical document was created using an electronic medical record system with

computerized dictation system.  Although this document has been carefully 

reviewed, there might still be some phonetic and typographical errors.  These 

areas are purely typographical due to imperfections of the software programs, 

and do not reflect any compromise in the patient's medical care.


Plan discussed with:  Patient, Other (Nurse)


My Orders





                           Orders - ROBERTO RODNEY MD








Procedure Category Date Status





  Time 


 


Platelet Monitoring [facility] 11/27/24 In Process





  10:38 


 


Vte Protocol Initiated [facility] 11/27/24 In Process





  10:38 


 


Heparin Per [facility] 11/27/24 In Process





Standardized Proce  10:38 


 


Discontinue All Im [facility] 11/27/24 In Process





Injections  10:38 


 


Oxygen By High-Flow RT 11/27/24 Transmitted





  11:21 


 


* Cardiology Consult CONS 11/27/24 Transmitted





  11:42 


 


Complete Blood Count LAB 11/28/24 Verified





  04:00 


 


Comprehensive LAB 11/28/24 Verified





Metabolic Panel  04:00 


 


Magnesium LAB 11/28/24 Verified





  04:00 


 


Heparin Drip/D5w PHA 11/27/24 In Process





100units/Ml  12:00 


 


PTPTT LAB 11/27/24 Logged





  18:00 


 


Heparin Per Pharmacy [facility] 11/27/24 In Process





Protocol  12:36 











Date of Service:  Nov 27, 2024


Billing Provider:  ROBERTO RODNEY MD


Common Visit Codes:  51585-[id number] CARE 30-74 MIN (120 minutes), [id number]

CARE-EACH +30MIN


Secondary Visit Codes:  99497-ADVANCED CARE PLAN 30 MINUTES (20 minutes)











ROBERTO RODNEY MD             Nov 27, 2024 13:08

## 2024-11-27 NOTE — DVHINCON2
Date Seen:  2024


Referring Physician


EMHRAN Venegas





Reason for Consultation


NSTEMI





History of Present Illness


This is a pleasant 64-year-old female who presented to the emergency room via 

EMS with a chief complaint of shortness of breath for two days.  The patient 

complains of progressive shortness of breath associated with a productive cough 

with yellowish sputum and chest pain described as substernal, sharp in nature, 

and worse with cough.  She was found with oxygen saturation levels of 87% on 

room air for which she was placed on supplemental oxygenation.  Upon arrival to 

the emergency room, the patient was on a non-rebreather mask at 15 

liters/minute.  At time of assessment, she was found on a BiPAP machine at 70%. 

Troponin levels are trending with latest in the 600s ng/L.  A 12-lead lili

ctrocardiogram revealed a sinus rhythm with anteroseptal/inferior ST changes as 

well as S1T3 pattern.   Of note, reports a history of pulmonary emboli diagnosed

in  and on Xarelto therapy.  Reports she ran out of Xarelto approximately a 

month ago.  Other medical history includes hypertension, severe asthma, 

depression, and remote history of tobacco use.





Past Medical History


Past medical history reviewed.  No other significant than mentioned above.





Past Surgical History








Family History:  


Patient reports no known family medical history.


Family History


Family history reviewed.


Social History


Denies the use of illicit drugs, alcohol, or tobacco use.


Reports a remote history of tobacco use, quit over 30 years ago.





Allergies:  


Coded Allergies:  


     NO KNOWN ALLERGIES (Unverified , 23)


Home Meds


Active Scripts


Mometasone Furoate-Formoterol (Dulera) 1 Aer Aer, 2 PUFF INH BID, #13 GRAMS 2 

Refills


   Prov:ROXANNE NEWMAN MD         10/28/24


Fluticasone Propionate (Nasal) (Fluticasone Propionate) 50 Mcg/Act Spr, 50 MCG 

NA BID for 30 Days, #2 SPR


   Prov:ROXANNE NEWMAN MD         10/28/24


Prednisone (Prednisone) 20 Mg Tab, 40 MG PO DAILY for 13 Days, #26 TAB


   Take 2 pills once daily for 5 days in case of exacerbation


   Prov:ROXANNE NEWMAN MD         10/28/24


Montelukast Sodium (Singulair) 10 Mg Tab, 10 MG PO HS for 30 Days, #30 TAB 1 

Refill


   Prov:ROXANNE NEWMAN MD         10/28/24


Famotidine (Famotidine) 20 Mg Tab, 20 MG PO DAILY for 30 Days, #30 TAB 1 Refill


   Prov:ROXANNE NEWMAN MD         10/28/24


Reported Medications


Quetiapine Fumerate (QUETIAPINE FUMARATE) 300 Mg Tab, 600 MG PO HS, TAB


   10/26/24


Amlodipine Besylate (Amlodipine Besylate) 5 Mg Tab, 1 TAB PO DAILY


   10/26/24


Hctz (Hydrochlorothiazide) 25 Mg Tab, 1 TAB PO DAILY


   10/26/24


Home Meds


Home medications reviewed.


Current Medications





                               Current Medications








 Medications


  (Trade)  Dose


 Ordered  Sig/Brian


 Route


 PRN Reason  Start Time


 Stop Time Status Last Admin


 


 Diagnostic Test


  (Pha)


  (Accu-Chek


 Comfort Curve T)  1 strip  IQ4HR


 VI


   24 20:00


    24 00:15





 


 Insulin Human


 Regular


  (InsuLIN R)    IQ4HR


 SC


   24 20:00


     





 


 Dextrose  50 ml  UD  PRN


 IV


 Blood Sugar LESS THAN 60  24 18:15


     





 


 Aspirin  81 mg  DAILY


 PO


   24 10:00


     





 


 Aspirin  325 mg  O


 PO


   24 18:15


 24 18:49 DC  





 


 Atorvastatin


 Calcium


  (Lipitor)  40 mg  HS


 PO


   24 22:00


    24 22:18





 


 Morphine Sulfate  2 mg  Q30MP  PRN


 IV


 FOR CHEST PAIN  24 18:15


     





 


 Acetaminophen


  (Tylenol Tablet)  650 mg  Q6HP  PRN


 PO


 MILD PAIN (1-3 PAIN SCALE)  24 18:15


     





 


 Docusate Sodium


  (Colace Capsule)  100 mg  DAILY


 PO


   24 10:00


     





 


 Nitroglycerin


  (Ntrostat


 Sublingual)  0.4 mg  Q5MINP  PRN


 SL


 FOR CHEST PAIN  24 18:15


 24 18:49 DC  





 


 Ondansetron HCl


  (Zofran)  4 mg  Q4HP  PRN


 IV


 NAUSEA / VOMITING  24 18:15


     





 


 Nitroglycerin


  (Ntrostat


 Sublingual)  0.4 mg  Q5MINP  PRN


 SL


 FOR CHEST PAIN  24 18:15


     





 


 Morphine Sulfate  2 mg  Q30M  PRN


 IV


 FOR CHEST PAIN  24 18:15


 24 18:49 DC  





 


 Albuterol


  (Ventolin Medneb)  2.5 mg  Q4HR


 NEB


   24 22:00


    24 05:58





 


 Methylprednisolone


 Sodium Succinate


  (Solu Medrol)  40 mg  BID


 IV


   24 22:00


    24 22:18





 


 Ceftriaxone Sodium  50 ml @ 


 100 mls/hr  DAILY@09


 IV


   24 09:00


     





 


 Azithromycin  250 ml @ 


 125 mls/hr  DAILY


 IV


   24 10:00


     





 


 Enoxaparin Sodium


  (Lovenox)  70 mg  Q12HR


 SC


   24 10:00


   UNV  














Review of Systems


Constitutional:  No symptom reported


Ears, Nose, & Throat:  No symptom reported


Eyes:  No symptom reported


Neurological: No symptoms reported


Pulmonary/Respiratory:  SOB


Cardiovascular: No symptom reported


Gastrointestinal:  No symptom reported


Genitourinary:  No symptom reported


Musculoskeletal:  No symptom reported


Skin:  No symptom reported


Psychiatric:  No symptom reported


Endocrine:  No symptom reported


Hemotologic/Lymphatic:  No symptom reported





Vital Signs





                                   Vital Signs








  Date Time  Temp Pulse Resp B/P (MAP) Pulse Ox O2 Delivery O2 Flow Rate FiO2


 


24 07:18 96.8 95 16 103/48 (66) 94   





 96.8       


 


24 07:18      Bi-Pap+  70





        70


 


24 05:58       12.0 








Physical Exam


General Appearance:  Cooperative. Well developed. Well nourished.  Moderate 

acute respiratory distress


Head Exam:  Normal inspection


Neck Exam:  Normal inspection. Non-tender. Normal alignment


Pulmonary/Respiratory:  Chest non-tender. Diminished bilateral breath sounds.  

On BiPAP at 70%


Cardiovascular/Chest: Regular rate and rhythm.  S1, S2.  Sinus rhythm with ST-

segment depression to anteroseptal/inferior leads.  S1T3 pattern present. 


Peripheral Pulses:  2+ Radial (R). 2+ Radial (L). 2+ Pedal (R). 2+ Pedal (L)


Abdominal Exam:  Normal bowel sounds. Soft. Nontender.  No hepatospenomegaly.  

No masses


Ankle Exam: Negative ankle edema


Lower extremities: Negative lower extremity edema


Neuro/Mental Status: A&O x4.  Coherent


Thoughts/Psych:  Normal thought pattern.  Appropriate mood and affect.  Pleasant


Appearance: Moderate acute respiratory distress


Skin Exam:  Normal inspection. Normal color. Warm. Dry





Labs/Diagnostic Data





                                      Labs








Test


 24


06:20 24


03:51 24


00:14 24


16:21 Range/Units


 


 


Blood Gas Specimen Type Arterial      


 


Blood Gas Sample Site Right radial      


 


Blood Gas Patient Temperature 37.0      


 


Arterial Blood Date Drawn       


 


Arterial Blood pH 7.388     7.350-7.450  


 


Arterial Blood Partial


Pressure CO2 49.7 H


 


 


 


 32.0-45.0  mmHg





 


Arterial Blood Partial


Pressure O2 50.8 *L


 


 


 


 83.0-108.0


mmHg


 


Arterial Blood HCO3


 29.3 H


 


 


 


 21.0-28.0


mmol/L


 


Arterial Blood Oxygen


Saturation 83.4 *L


 


 


 


 94.0-98.0  %





 


Arterial Blood Base Excess


 3.3 H


 


 


 


 -2.0-3.0


mmol/L


 


Arterial Blood Oxyhemoglobin 82.1 L    94.0-98.0  %


 


Arterial Blood


Carboxyhemoglobin 1.1 


 


 


 


 0.5-1.5  %





 


Arterial Blood Methemoglobin 0.4     0.0-1.5  %


 


Erick Test Yes      


 


Blood Gas Total Hemoglobin 14.80     12.0-16.0  g/dL


 


Blood Gas Liter Flow 10.00      


 


Blood Gas Modality Oxymizer      


 


FiO2 % 72.0      


 


Blood Gas Critical Value Read


Back Yes 


 


 


 


  





 


Blood Gas Notified Whom Np tonny      


 


Blood Gas Notified Time [id number]      


 


Blood Gas Notified By


 Rrt


yi 


 


 


  





 


White Blood Count


 


 5.5 


 


 


 4.4-10.8


10^3/uL


 


Red Blood Count


 


 4.26 


 


 


 4.0-5.20


10^6/uL


 


Hemoglobin  14.7    12.2-16.2  g/dL


 


Hematocrit  42.8    36.0-46.0  %


 


Mean Corpuscular Volume  100.5 H   80.0-100.0  fL


 


Mean Corpuscular Hemoglobin  34.4 H   28.0-32.0  pg


 


Mean Corpuscular Hemoglobin


Concent 


 34.2 


 


 


 32.0-36.0  g/dL





 


Red Cell Distribution Width  14.9 H   11.8-14.3  %


 


Platelet Count


 


 210 


 


 


 140-450


10^3/uL


 


Mean Platelet Volume  8.0    6.9-10.8  fL


 


Neutrophils (%) (Auto)  90.7 H   37.0-80.0  %


 


Lymphocytes (%) (Auto)  6.6 L   10.0-50.0  %


 


Monocytes (%) (Auto)  2.4    0.0-12.0  %


 


Eosinophils (%) (Auto)  0.1    0.0-7.0  %


 


Basophils (%) (Auto)  0.2    0.0-2.0  %


 


Neutrophils # (Auto)


 


 5.0 


 


 


 1.6-8.6  10


^3/uL


 


Lymphocytes # (Auto)


 


 0.4 


 


 


 0.4-5.4  10


^3/uL


 


Monocytes # (Auto)  0.1    0-1.3  10 ^3/uL


 


Eosinophils # (Auto)  0    0-0.8  10 ^3/uL


 


Basophils # (Auto)  0    0-0.2  10 ^3/uL


 


Nucleated Red Blood Cells  0.6     %


 


Sodium Level  140    136-145  mmol/L


 


Potassium Level  3.9    3.5-5.1  mmol/L


 


Chloride Level  104      mmol/L


 


Carbon Dioxide Level  27    20-31  mmol/L


 


Anion Gap  9    5-15  


 


Blood Urea Nitrogen  24 H   9-23  mg/dL


 


Creatinine


 


 1.27 H


 


 


 0.550-1.02


mg/dL


 


Glomerular Filtration Rate


Calc 


 47 


 


 


 >90  mL/min





 


BUN/Creatinine Ratio  18.9    10.0-20.0  


 


Serum Glucose  145 H     mg/dL


 


Calcium Level  9.8    8.7-10.4  mg/dL


 


Magnesium Level  1.8    1.6-2.6  mg/dL


 


Total Bilirubin  0.5    0.2-1.0  mg/dL


 


Aspartate Amino Transferase


(AST) 


 20 


 


 


 13-40  U/L





 


Alanine Aminotransferase (ALT)  22    7-40  U/L


 


Alkaline Phosphatase  73      U/L


 


Troponin I High Sensitivity  617 *H   </=34  ng/L


 


Total Protein  6.1    5.7-8.2  g/dL


 


Albumin  3.9    3.2-4.8  g/dL


 


POC Glucose   144 H    mg/dl


 


Venous Blood pH    7.359  7.320-7.430  


 


Venous Blood pCO2 at Patient


Temp 


 


 


 47.9 


 38.0-54.0  mmHg





 


Venous Blood pO2 at Patient


Temp 


 


 


 54.8 H


 23.0-48.0  mmHg





 


Venous Blood HCO3


 


 


 


 26.4 


 22.0-29.0


mmol/L


 


Venous Blood Base Excess


 


 


 


 0.3 


 -2.0-3.0


mmol/L


 


Blood Gas Set Respiration Rate    12.0   


 


Blood Gas Pressure Support    7   


 


Blood Gas EPAP    5   


 


Blood Gas IPAP    12   


 


Test


 24


15:25 


 


 


 Range/Units


 


 


B-Type Natriuretic Peptide 988.20     0-100  pg/mL











Assessment


NSTEMI rule out acute pulmonary emboli


Rule out structural heart disease/RV strain


Acute on chronic hypoxic respiratory failure


Hx of PE in , off Xarelto x 1 mo.


Asthma exacerbation


Acute kidney injury


Suboptimal medical therapy


Plan/Recommendation


(Dr. Sanchez)





We will continue further evaluation with a transthoracic echocardiogram to rule 

out structural heart disease/RV strain as well as CT angio with contrast to rule

out an acute PE.  The patient stopped Xarelto therapy approximately a month ago.

 In the meantime, initiate therapeutic Lovenox and continue supplemental ox

ygenation.  Trend troponin levels closely and monitor ECG changes.  Further 

work-up per clinical course.  Thank you for allowing us to participate in this 

patient's care.  Please call if you have any questions or concerns.





Critical care time:  40 min.  This medical document was created using an 

electronic medical record system with voice recognition software and comput

Localbaseized dictation system.  Although this document has been carefully reviewed, 

there might still be some phonetic and typographical errors.  Occasional wrong-

word or ``sound-alike substitutions may have occurred due to the inherent 

limitations of voice recognition software.  These areas are purely typographical

due to imperfections of the software programs and do not reflect any compromise 

in the patient's medical care.  Please read the chart carefully and recognize, 

using context, where these substitutions have occurred.


Plan discussed with:  Patient, Other





Date of Service:  2024


Billing Provider:  YO SANCHEZ MD


Cardiology Common Codes:  62373-[id number] CARE 30-74 MIN











DAJUAN FARRAR [facility]           2024 08:56

## 2024-11-27 NOTE — DVH
CTA Chest with  intravenous contrast



INDICATION: SOB RULE OUT PE



COMPARISON: None



TECHNIQUE: Multidetector spiral CTA of the chest was performed of the chest with intravenous contrast
. PULMONARY ANGIOGRAPHY PROTOCOL was utilized using a bolus-tracking technique centered on the main p
ulmonary artery. Axial, coronal and sagittal multiplanar and MIP reformats were performed.



CONTRAST:



Type of contrast: Omni 350



Contrast injected: 100 ml 



Radiation dose : 



Chest:        CTDI volume is  50 mGy. Dose-length product is  897.86 mGy*cm



The dose indicators for CT are the volume computed Tomography (CT) dose Index (CTDIvol) and the dose 
Length product (DLP), and are measured in units of mGy and mGy-cm, respectively. These indicators are
 not patient dose, but values generated from the CT scanner acquisition factors. The report includes 
radiation exposure data for exposures received during this examination. 



Findings: Exam is limited by motion artifact.



Pulmonary artery: 



There are filling defects in right upper, middle and lower lobe segmental and subsegmental branches. 
There is a curvilinear defect in the left lower lobe pulmonary artery.  There is a filling defect in 
a subsegmental left lower lobe branch. There is evidence of right heart strain.



Lower neck: Normal thyroid.



Lungs: Patchy consolidation in the left lower lung. Ground-glass opacities in both lungs. Calcified g
ranuloma in the right lung.



Heart/Vascular Structures: Dilated right ventricle. Small pericardial effusion.



Lymph Nodes: No adenopathy



Pleura: No pleural effusion or significant pneumothorax.



Musculoskeletal: No acute osseous abnormality.



Soft tissues: Normal.



Upper abdomen: Limited portions of the upper abdomen are unremarkable.



IMPRESSION: 



1. Limited by motion artifact.  Bilateral pulmonary emboli greater in the right lung. Evidence of rig
ht heart strain.



 



2. Patchy consolidation in the left lung likely represents pneumonia. Evidence of prior granulomatous
 disease.  Small pericardial effusion. Clinical correlation and continued follow-up is recommended.



Critical Result: Pumonary Emboli 



Findings discussed with ELISSA CH at 11/27/2024 10:04 AM, and acknowledged receipt and understandi
ng of the findings.



.. 



HS:Y



Electronically Signed by: Prateek Castellanos at 11/27/2024 10:04:42 AM

## 2024-11-27 NOTE — DVHSR
--------------- APPROVED REPORT --------------





EXAM: LIMITED Two-dimensional and M-mode echocardiogram with Doppler and color Doppler.



Blood Pressure: 98/66 mmHg



INDICATION

elevated troponin



RISK FACTORS

Height: 5'5, Weight: 161



DIMENSIONS 

LVDd3.1 (3.8-5.7cm)LA (2D) (1.9-4.0cm)Aortic Root3.4 (2.0-3.7cm)

LVDs1.7 (2.5-4.0cm)LA (MM) (1.9-4.0cm)Aortic Cusp Exc1.6 (1.5-2.0cm)

EF (%) 60.0 (55-70%)Rt. Atrium4.3 (1.9-4.0cm)Asc. Aorta3.6 cm

IVSd1.1 (0.7-1.1cm)RV (D) (1.8-2.4cm)

PWd1.0 (0.7-1.1cm)



Mitral Valve

MitralMitral Stenosis

E/A ratio0.02D MVAcm2



Aortic Valve

Aortic ValveAortic Stenosis

LVOT Diameter2.2 (1.8-2.4cm)Doppler AVAcm2



Tricuspid Valve

TR Velocity3.44m/s

[id number]



 Other Information 

Quality : LimitedRhythm : 

Technically limited study due to  body habitus.patient position.



Conclusion

Normal left ventricular size and dimension.  Normal left ventricular systolic function estimated ejec
tion fraction 55%.  There is a grade one diastolic dysfunction.  

Moderately dilated right ventricle.  Moderately severely reduced left ventricular systolic function. 
 Severely elevated right ventricular systolic pressure at 65 mm of mercury.

Normal biatrial size and dimension. 

Normal aortic valve structure and function.  

Normal Mitral valve structure and function.  

There is mild-to-moderate tricuspid valve regurgitation.

The pulmonary valve is grossly normal.  

No pericardial effusion.

## 2024-11-27 NOTE — ECG
[facility]

                                       

Test Date:    2024               Test Time:    14:53:53

Pat Name:     DAJA WHALEN         Department:   ER

Patient ID:   [id number]           Room:         [address] [place] [address] A

Gender:       F                        Technician:   ROBERT

:          1960               Requested By: WAYNE BABB

Order Number: [id number]        Reading MD:   Dusty Calixto

                                 Measurements

Intervals                              Axis          

Rate:         98                       P:            86

GA:           198                      QRS:          221

QRSD:         99                       T:            -36

QT:           370                                    

QTc:          473                                    

                           Interpretive Statements

Sinus rhythm

RVH with secondary repolarization abnrm

Baseline wander in lead(s) I

Electronically Signed On 2024 14:17:26 PST by Dusty Calixto



Please click the below link to view image of tracing.

## 2024-11-27 NOTE — DVH
Bilateral lower extremity venous duplex



Clinical History: PE r/o DVT



Comparison: None



Technique:



Duplex Doppler evaluation of the deep venous systems of both lower extremities from the common femora
l veins to the popliteal veins including color Doppler and spectral/pulsed waveform analysis was perf
ormed.



Findings:



RIGHT SIDE:



The common femoral vein demonstrates appropriate compressibility and waveform variability.



There is compressibility/patency of the great saphenous vein at the proximal thigh.



The femoral vein demonstrates appropriate compressibility and waveform variability.



The deep femoral vein demonstrates appropriate compressibility and waveform variability.



The popliteal vein demonstrates appropriate compressibility and waveform variability.



There is normal compressibility at the tibioperoneal trunk.



LEFT SIDE:



The common femoral vein demonstrates appropriate compressibility and waveform variability.



There is compressibility/patency of the great saphenous vein at the proximal thigh.



The femoral vein demonstrates appropriate compressibility and waveform variability.



The deep femoral vein demonstrates appropriate compressibility and waveform variability.



The popliteal vein demonstrates appropriate compressibility and waveform variability.



There is normal compressibility at the tibioperoneal trunk.



Impression:



1. 



 No right or left femoropopliteal venous thrombosis.



HS:Y



Electronically Signed by: Pablito Butts at 11/27/2024 12:17:36 PM

## 2024-11-28 VITALS
RESPIRATION RATE: 11 BRPM | SYSTOLIC BLOOD PRESSURE: 105 MMHG | OXYGEN SATURATION: 94 % | HEART RATE: 94 BPM | DIASTOLIC BLOOD PRESSURE: 68 MMHG

## 2024-11-28 VITALS
HEART RATE: 93 BPM | OXYGEN SATURATION: 94 % | DIASTOLIC BLOOD PRESSURE: 78 MMHG | SYSTOLIC BLOOD PRESSURE: 123 MMHG | RESPIRATION RATE: 15 BRPM | TEMPERATURE: 98.2 F

## 2024-11-28 VITALS
OXYGEN SATURATION: 95 % | DIASTOLIC BLOOD PRESSURE: 78 MMHG | SYSTOLIC BLOOD PRESSURE: 122 MMHG | HEART RATE: 93 BPM | RESPIRATION RATE: 14 BRPM

## 2024-11-28 VITALS — OXYGEN SATURATION: 94 % | RESPIRATION RATE: 16 BRPM | HEART RATE: 94 BPM

## 2024-11-28 VITALS
RESPIRATION RATE: 13 BRPM | OXYGEN SATURATION: 94 % | HEART RATE: 97 BPM | DIASTOLIC BLOOD PRESSURE: 66 MMHG | SYSTOLIC BLOOD PRESSURE: 110 MMHG

## 2024-11-28 VITALS
RESPIRATION RATE: 16 BRPM | DIASTOLIC BLOOD PRESSURE: 62 MMHG | HEART RATE: 101 BPM | OXYGEN SATURATION: 94 % | SYSTOLIC BLOOD PRESSURE: 108 MMHG

## 2024-11-28 VITALS
OXYGEN SATURATION: 93 % | DIASTOLIC BLOOD PRESSURE: 62 MMHG | SYSTOLIC BLOOD PRESSURE: 108 MMHG | HEART RATE: 100 BPM | RESPIRATION RATE: 18 BRPM

## 2024-11-28 VITALS
DIASTOLIC BLOOD PRESSURE: 72 MMHG | HEART RATE: 95 BPM | SYSTOLIC BLOOD PRESSURE: 113 MMHG | RESPIRATION RATE: 21 BRPM | OXYGEN SATURATION: 91 %

## 2024-11-28 VITALS
OXYGEN SATURATION: 95 % | SYSTOLIC BLOOD PRESSURE: 110 MMHG | DIASTOLIC BLOOD PRESSURE: 66 MMHG | HEART RATE: 96 BPM | RESPIRATION RATE: 18 BRPM

## 2024-11-28 VITALS
RESPIRATION RATE: 15 BRPM | SYSTOLIC BLOOD PRESSURE: 106 MMHG | HEART RATE: 100 BPM | DIASTOLIC BLOOD PRESSURE: 69 MMHG | OXYGEN SATURATION: 100 %

## 2024-11-28 VITALS
RESPIRATION RATE: 12 BRPM | DIASTOLIC BLOOD PRESSURE: 75 MMHG | SYSTOLIC BLOOD PRESSURE: 107 MMHG | HEART RATE: 92 BPM | OXYGEN SATURATION: 94 %

## 2024-11-28 VITALS
SYSTOLIC BLOOD PRESSURE: 123 MMHG | HEART RATE: 99 BPM | RESPIRATION RATE: 18 BRPM | OXYGEN SATURATION: 89 % | DIASTOLIC BLOOD PRESSURE: 78 MMHG

## 2024-11-28 VITALS
OXYGEN SATURATION: 93 % | RESPIRATION RATE: 14 BRPM | DIASTOLIC BLOOD PRESSURE: 71 MMHG | SYSTOLIC BLOOD PRESSURE: 101 MMHG | HEART RATE: 97 BPM

## 2024-11-28 VITALS
OXYGEN SATURATION: 95 % | HEART RATE: 89 BPM | DIASTOLIC BLOOD PRESSURE: 83 MMHG | SYSTOLIC BLOOD PRESSURE: 128 MMHG | RESPIRATION RATE: 16 BRPM

## 2024-11-28 VITALS
OXYGEN SATURATION: 97 % | SYSTOLIC BLOOD PRESSURE: 107 MMHG | HEART RATE: 99 BPM | DIASTOLIC BLOOD PRESSURE: 70 MMHG | RESPIRATION RATE: 23 BRPM

## 2024-11-28 VITALS
OXYGEN SATURATION: 94 % | SYSTOLIC BLOOD PRESSURE: 111 MMHG | RESPIRATION RATE: 18 BRPM | DIASTOLIC BLOOD PRESSURE: 57 MMHG | HEART RATE: 92 BPM

## 2024-11-28 VITALS
HEART RATE: 99 BPM | RESPIRATION RATE: 18 BRPM | SYSTOLIC BLOOD PRESSURE: 110 MMHG | OXYGEN SATURATION: 94 % | DIASTOLIC BLOOD PRESSURE: 78 MMHG

## 2024-11-28 VITALS
OXYGEN SATURATION: 91 % | DIASTOLIC BLOOD PRESSURE: 57 MMHG | RESPIRATION RATE: 15 BRPM | SYSTOLIC BLOOD PRESSURE: 108 MMHG | HEART RATE: 94 BPM

## 2024-11-28 VITALS
HEART RATE: 98 BPM | SYSTOLIC BLOOD PRESSURE: 112 MMHG | RESPIRATION RATE: 20 BRPM | DIASTOLIC BLOOD PRESSURE: 79 MMHG | OXYGEN SATURATION: 100 %

## 2024-11-28 VITALS — HEART RATE: 89 BPM | RESPIRATION RATE: 12 BRPM | OXYGEN SATURATION: 94 %

## 2024-11-28 VITALS
HEART RATE: 98 BPM | OXYGEN SATURATION: 93 % | SYSTOLIC BLOOD PRESSURE: 112 MMHG | DIASTOLIC BLOOD PRESSURE: 65 MMHG | RESPIRATION RATE: 13 BRPM

## 2024-11-28 VITALS
RESPIRATION RATE: 17 BRPM | HEART RATE: 98 BPM | SYSTOLIC BLOOD PRESSURE: 111 MMHG | OXYGEN SATURATION: 95 % | DIASTOLIC BLOOD PRESSURE: 67 MMHG

## 2024-11-28 VITALS
SYSTOLIC BLOOD PRESSURE: 119 MMHG | HEART RATE: 96 BPM | OXYGEN SATURATION: 92 % | DIASTOLIC BLOOD PRESSURE: 69 MMHG | RESPIRATION RATE: 14 BRPM

## 2024-11-28 VITALS
OXYGEN SATURATION: 90 % | RESPIRATION RATE: 19 BRPM | SYSTOLIC BLOOD PRESSURE: 114 MMHG | DIASTOLIC BLOOD PRESSURE: 70 MMHG | HEART RATE: 92 BPM

## 2024-11-28 VITALS
HEART RATE: 100 BPM | DIASTOLIC BLOOD PRESSURE: 80 MMHG | OXYGEN SATURATION: 94 % | SYSTOLIC BLOOD PRESSURE: 126 MMHG | RESPIRATION RATE: 15 BRPM

## 2024-11-28 VITALS
HEART RATE: 94 BPM | RESPIRATION RATE: 13 BRPM | DIASTOLIC BLOOD PRESSURE: 57 MMHG | OXYGEN SATURATION: 90 % | SYSTOLIC BLOOD PRESSURE: 111 MMHG

## 2024-11-28 VITALS — OXYGEN SATURATION: 92 % | RESPIRATION RATE: 20 BRPM | HEART RATE: 93 BPM

## 2024-11-28 VITALS
SYSTOLIC BLOOD PRESSURE: 112 MMHG | RESPIRATION RATE: 18 BRPM | HEART RATE: 101 BPM | TEMPERATURE: 98.3 F | DIASTOLIC BLOOD PRESSURE: 69 MMHG | OXYGEN SATURATION: 98 %

## 2024-11-28 VITALS
DIASTOLIC BLOOD PRESSURE: 71 MMHG | RESPIRATION RATE: 15 BRPM | OXYGEN SATURATION: 95 % | SYSTOLIC BLOOD PRESSURE: 107 MMHG | HEART RATE: 98 BPM

## 2024-11-28 VITALS
HEART RATE: 94 BPM | OXYGEN SATURATION: 94 % | DIASTOLIC BLOOD PRESSURE: 78 MMHG | SYSTOLIC BLOOD PRESSURE: 110 MMHG | RESPIRATION RATE: 18 BRPM

## 2024-11-28 LAB
ALBUMIN SERPL-MCNC: 4.2 G/DL (ref 3.2–4.8)
ALP SERPL-CCNC: 78 U/L (ref 46–116)
ALT SERPL-CCNC: 19 U/L (ref 7–40)
ANION GAP SERPL CALCULATED.3IONS-SCNC: 8 MMOL/L (ref 5–15)
APTT PPP: 48.2 SEC (ref 24.5–34.5)
APTT PPP: 68.3 SEC (ref 24.5–34.5)
APTT PPP: > 139 SEC (ref 24.5–34.5)
BILIRUB SERPL-MCNC: 0.5 MG/DL (ref 0.2–1)
BUN SERPL-MCNC: 29 MG/DL (ref 9–23)
BUN/CREAT SERPL: 26.6 (ref 10–20)
CALCIUM SERPL-MCNC: 10.1 MG/DL (ref 8.7–10.4)
CHLORIDE SERPL-SCNC: 104 MMOL/L (ref 98–107)
CO2 SERPL-SCNC: 28 MMOL/L (ref 20–31)
GLUCOSE SERPL-MCNC: 145 MG/DL (ref 74–106)
HCT VFR BLD AUTO: 43.2 % (ref 36–46)
HGB BLD-MCNC: 14.5 G/DL (ref 12.2–16.2)
INR PPP: 1.03 (ref 0.9–1.15)
INR PPP: 1.03 (ref 0.9–1.15)
INR PPP: 1.06 (ref 0.9–1.15)
INR PPP: 1.07 (ref 0.9–1.15)
MAGNESIUM SERPL-MCNC: 2 MG/DL (ref 1.6–2.6)
MCH RBC QN AUTO: 33.8 PG (ref 28–32)
MCV RBC AUTO: 101 FL (ref 80–100)
NRBC BLD QL AUTO: 0.6 %
POTASSIUM SERPL-SCNC: 3.9 MMOL/L (ref 3.5–5.1)
PROT SERPL-MCNC: 6.4 G/DL (ref 5.7–8.2)
PROTHROMBIN TIME: 10.9 SEC (ref 9.3–11.8)
PROTHROMBIN TIME: 10.9 SEC (ref 9.3–11.8)
PROTHROMBIN TIME: 11.2 SEC (ref 9.3–11.8)
PROTHROMBIN TIME: 11.3 SEC (ref 9.3–11.8)
SODIUM SERPL-SCNC: 140 MMOL/L (ref 136–145)

## 2024-11-28 PROCEDURE — 5A0935A ASSISTANCE WITH RESPIRATORY VENTILATION, LESS THAN 24 CONSECUTIVE HOURS, HIGH NASAL FLOW/VELOCITY: ICD-10-PCS | Performed by: INTERNAL MEDICINE

## 2024-11-28 RX ADMIN — HEPARIN SODIUM AND DEXTROSE SCH MLS/HR: 10000; 5 INJECTION INTRAVENOUS at 04:38

## 2024-11-28 RX ADMIN — FUROSEMIDE ONE MG: 10 INJECTION, SOLUTION INTRAMUSCULAR; INTRAVENOUS at 12:53

## 2024-11-28 RX ADMIN — HEPARIN SODIUM AND DEXTROSE SCH MLS/HR: 10000; 5 INJECTION INTRAVENOUS at 17:15

## 2024-11-28 NOTE — DVHPN2
Subjective


Decreasing SOB and chest tightness


Reviewed:  Care Plan, H&P, Labs, Medications, Previous Orders, Radiology, Other 

(Consultation)


Changes from previous H/P or p:  Changes





Objective


Vitals





Vital Signs








  Date Time  Temp Pulse Resp B/P (MAP) Pulse Ox O2 Delivery O2 Flow Rate FiO2


 


11/28/24 10:47  92 18  94  60.0 70


 


11/28/24 10:00      Hi-Flow Heated NC+  


 


11/28/24 06:00    123/78 (93)    


 


11/27/24 20:28 98.3       





 98.3       








Intake/Output











                               Intake and Output 


 


 11/28/24





 07:00


 


Intake Total 645 ml


 


Balance 645 ml


 


 


 


Intake IV Total 645 ml


 


# Voids 1








General Appearance:  Alert, Oriented X3, Cooperative, moderate distress


HEENT:  Atraumatic


Lungs:  Other (Decreased air entry bilateral)


Cardiovascular:  Normal S1, Normal S2, Other (Tachycardia)


Abdomen:  Normal bowel sounds, Soft, No tenderness


Neuro:  Normal speech, Cranial nerves 3-12 NL


Psych/Mental Status:  Mental status NL, Mood NL


Medications





                               Current Medications








 Medications  Dose


 Ordered  Sig/Brian


 Route  Start Time


 Stop Time Status Last Admin


Dose Admin


 


 Diagnostic Test


  (Pha)  1 strip  IQ4HR


 VI  11/26/24 20:00


    11/28/24 08:01


1 STRIP


 


 Insulin Human


 Regular    IQ4HR


 SC  11/26/24 20:00


    11/28/24 08:01


2 UNITS


 


 Dextrose  50 ml  UD  PRN


 IV  11/26/24 18:15


     





 


 Aspirin  81 mg  DAILY


 PO  11/27/24 10:00


    11/28/24 09:25


81 MG


 


 Atorvastatin


 Calcium  40 mg  HS


 PO  11/26/24 22:00


    11/27/24 22:19


40 MG


 


 Morphine Sulfate  2 mg  Q30MP  PRN


 IV  11/26/24 18:15


     





 


 Acetaminophen  650 mg  Q6HP  PRN


 PO  11/26/24 18:15


     





 


 Docusate Sodium  100 mg  DAILY


 PO  11/27/24 10:00


    11/28/24 09:25


100 MG


 


 Ondansetron HCl  4 mg  Q4HP  PRN


 IV  11/26/24 18:15


     





 


 Nitroglycerin  0.4 mg  Q5MINP  PRN


 SL  11/26/24 18:15


     





 


 Albuterol  2.5 mg  Q4HR


 NEB  11/26/24 22:00


    11/28/24 10:47


2.5 MG


 


 Methylprednisolone


 Sodium Succinate  40 mg  BID


 IV  11/26/24 22:00


    11/28/24 09:25


40 MG


 


 Ceftriaxone Sodium  50 ml @ 


 100 mls/hr  DAILY@09


 IV  11/27/24 09:00


    11/28/24 09:25


100 MLS/HR


 


 Azithromycin  250 ml @ 


 125 mls/hr  DAILY


 IV  11/27/24 10:00


    11/28/24 10:00


125 MLS/HR


 


 Heparin Sodium/


 Dextrose  250 ml @ 


 13 mls/hr  [id number]


 IV  11/28/24 04:20


    11/28/24 09:28


13 MLS/HR


 


 Furosemide  20 mg  DAILY


 IV  11/29/24 10:00


     














Laboratory Results


Laboratory Tests


11/28/24 02:44











Chemistry








Test


 11/28/24


02:44


 


Albumin


 4.2 g/dL


(3.2-4.8)


 


Calcium Level


 10.1 mg/dL


(8.7-10.4)


 


Magnesium Level


 2.0 mg/dL


(1.6-2.6)


 


Total Protein


 6.4 g/dL


(5.7-8.2)








Coagulation








Test


 11/27/24


18:08 11/28/24


02:44 11/28/24


10:22


 


Prothrombin Time


 10.7 sec


(9.3-11.8) 11.2 sec


(9.3-11.8) 10.9 sec


(9.3-11.8)


 


Prothrombin Time INR


 1.01


(0.9-1.15) 1.06


(0.9-1.15) 1.03


(0.9-1.15)


 


Activated Partial


Thromboplast Time 25.9 SEC


(24.5-34.5) > 139.0 SEC


(24.5-34.5)  *H 68.3 SEC


(24.5-34.5)  H








LFT








Test


 11/28/24


02:44


 


Alanine Aminotransferase (ALT) 19 U/L (7-40)  


 


Alkaline Phosphatase


 78 U/L


()


 


Aspartate Amino Transferase


(AST) 17 U/L (13-40)





 


Total Bilirubin


 0.5 mg/dL


(0.2-1.0)








Labs and/or images reviewed:  Labs reviewed by me, Image(s) reviewed by me





Assessment/Plan


Assessment/Plan


A 64-year-old female patient; with multiple comorbidities; who presented to 

emergency department with SOB and chest tightness.








#Acute hypoxic respiratory failure due to bilateral pulmonary emboli; reviewed 

the available imaging studies; continue oxygen therapy as needed; now on high-

flow nasal cannula 60 L/min; was on BiPAP; continue close monitoring


#Submassive bilateral pulmonary emboli with RV strain in the setting of history 

of pulmonary embolism 2009; causing SOB and chest tightness; stopped Xarelto 

last month; continue heparin infusion; cardiology is following; reviewed chest 

angiogram and echocardiogram; no DVTs; continue close monitoring


#Chest tightness with NSTEMI type 2; demand ischemia; the setting of RV strain 

secondary to submassive bilateral pulmonary emboli; telemetry; continue aspirin 

and statin; cardiology is following; continue close monitoring


#Essential hypertension; continue antihypertensive medications as indicated; 

continue monitoring


#Hyperglycemia; prediabetic; continue insulin sliding scale and hypoglycemia 

protocol; continue monitoring


#Asthma exacerbation; continue IV steroids with IV antibiotics; continue oxygen 

therapy as above; continue monitoring


#Metabolic syndrome with prediabetes and dyslipidemia along with 

overweight/obesity; counseled the patient on the importance of adopting healthy 

lifestyle with diet and exercise in order to lose weight


#Dyslipidemia; continue statin; reviewed lipid profile; continue monitoring


#Prediabetes; hemoglobin A1c of 6.1; management as above; continue monitoring


#GOPAL; most likely vasomotor nephropathy in the setting of submassive bilateral 

pulmonary emboli and RV strain; avoid nephrotoxic agents; continue monitoring


#Macrocytosis without anemia; to investigate as outpatient; continue monitoring


#Low TSH; free T4 pending; continue monitoring








66 minutes of critical care time





Late entry








This medical document was created using an electronic medical record system with

computerized dictation system.  Although this document has been carefully 

reviewed, there might still be some phonetic and typographical errors.  These 

areas are purely typographical due to imperfections of the software programs, 

and do not reflect any compromise in the patient's medical care.


Plan discussed with:  Patient, Other (Nurse)


My Orders





                           Orders - ROBERTO RODNEY MD








Procedure Category Date Status





  Time 


 


Heparin Per Pharmacy DAO 11/27/24 In Process





Protocol  12:36 


 


Mrsa Screen KIKE 11/27/24 Uncollected





  21:10 


 


Heparin Drip/D5w PHA 11/28/24 In Process





100units/Ml  04:20 


 


Free T4 (Free LAB 11/28/24 In Process





Thyroxine)  08:17 


 


PTPTT LAB 11/28/24 Logged





  16:00 











Date of Service:  Nov 28, 2024


Billing Provider:  ROBERTO RODNEY MD


Common Visit Codes:  37946-[id number] CARE 30-74 MIN (66 minutes)











ROBERTO RODNEY MD             Nov 28, 2024 12:32

## 2024-11-28 NOTE — DVHPN2
Consult Progress Note


Date Seen:  Nov 28, 2024


Subjective


Review of Systems:  CVS:Normal, RESPIRATORY:Normal, NEURO:Normal





Objective


vital signs





                                   Vital Sign








  Date Time  Temp Pulse Resp B/P (MAP) Pulse Ox O2 Delivery O2 Flow Rate FiO2


 


11/28/24 07:15  94 18  94  70.0 75


 


11/28/24 06:00      Hi-Flow Heated NC+  


 


11/28/24 06:00    123/78 (93)    


 


11/27/24 20:28 98.3       





 98.3       














                           Total Intake and Output   


 


 11/27/24 11/27/24 11/28/24





 15:00 23:00 07:00


 


Intake Total 314 ml 228 ml 90 ml


 


Balance 314 ml 228 ml 90 ml








medications





                               Current Medications








 Medications  Dose


 Ordered  Sig/Brian


 Route  Start Time


 Stop Time Status Last Admin


Dose Admin


 


 Diagnostic Test


  (Pha)  1 strip  IQ4HR


 VI  11/26/24 20:00


    11/28/24 08:01


1 STRIP


 


 Insulin Human


 Regular    IQ4HR


 SC  11/26/24 20:00


    11/28/24 08:01


2 UNITS


 


 Dextrose  50 ml  UD  PRN


 IV  11/26/24 18:15


     





 


 Aspirin  81 mg  DAILY


 PO  11/27/24 10:00


    11/27/24 10:51


81 MG


 


 Atorvastatin


 Calcium  40 mg  HS


 PO  11/26/24 22:00


    11/27/24 22:19


40 MG


 


 Morphine Sulfate  2 mg  Q30MP  PRN


 IV  11/26/24 18:15


     





 


 Acetaminophen  650 mg  Q6HP  PRN


 PO  11/26/24 18:15


     





 


 Docusate Sodium  100 mg  DAILY


 PO  11/27/24 10:00


    11/27/24 10:50


100 MG


 


 Ondansetron HCl  4 mg  Q4HP  PRN


 IV  11/26/24 18:15


     





 


 Nitroglycerin  0.4 mg  Q5MINP  PRN


 SL  11/26/24 18:15


     





 


 Albuterol  2.5 mg  Q4HR


 NEB  11/26/24 22:00


    11/28/24 07:15


2.5 MG


 


 Methylprednisolone


 Sodium Succinate  40 mg  BID


 IV  11/26/24 22:00


    11/27/24 22:19


40 MG


 


 Ceftriaxone Sodium  50 ml @ 


 100 mls/hr  DAILY@09


 IV  11/27/24 09:00


    11/27/24 09:42


100 MLS/HR


 


 Azithromycin  250 ml @ 


 125 mls/hr  DAILY


 IV  11/27/24 10:00


    11/27/24 13:05


125 MLS/HR


 


 Heparin Sodium/


 Dextrose  250 ml @ 


 13 mls/hr  [id number]


 IV  11/28/24 04:20


    11/28/24 04:38


13 MLS/HR








Examination:  LUNGS:Abnormal (High-flow O2 at 75%), CVS:Normal, NEURO:Normal


laboratory and microbiology


                                Laboratory Tests


11/28/24 02:44

















Test


 11/28/24


02:44 Range/Units


 


 


Serum Glucose 145 H   mg/dL











Problem List/Assessment/Plan


Problem List/Assessment/Plan


NSTEMI with bilateral pulmonary emboli and RV strain, S1T3 pattern


Acute on chronic hypoxic respiratory failure


Left lung pneumonia


Hx of PE in 2009, off Xarelto x 1 mo.


Asthma exacerbation


Acute kidney injury


Pre-diabetes, newly diagnosed


Suboptimal medical therapy





Plan/Recommendation


(Dr. Sanchez)





Transthoracic echocardiogram revealed EF 55%  with a moderately dilated right 

ventricle, moderate/severe reduced right ventricular systolic function, and RVSP

at 65 mmHg.  The patient with a history of PE stopped Xarelto therapy 

approximately a month ago.  She now presents with bilateral PE R>L with evidence

of heart strain as well as PNA.  Continuos of high-flow O2 currently at 75%.  

Case discussed with Dr. Sanchez, we will hold off from invasive procedures and 

monitor closely at this time.  Continue heparin drip per pharmacy protocol.  ABX

therapy per primary care team.  Repeat CXR.  Further work-up per clinical 

course.  Thank you for allowing us to participate in this patient's care.  

Please call if you have any questions or concerns.





Critical care time:  30 min.  This medical document was created using an 

electronic medical record system with voice recognition software and 

computerized dictation system.  Although this document has been carefully 

reviewed, there might still be some phonetic and typographical errors.  

Occasional wrong-word or ``sound-alike substitutions may have occurred due to 

the inherent limitations of voice recognition software.  These areas are purely 

typographical due to imperfections of the software programs and do not reflect 

any compromise in the patient's medical care.  Please read the chart carefully 

and recognize, using context, where these substitutions have occurred.


Plan discussed with:  Patient, Other





Date of Service:  Nov 28, 2024


Billing Provider:  YO SANCHEZ MD


Cardiology Common Codes:  07166-[id number] CARE 30-74 MIN











DAJUAN FARRAR [facility]           Nov 28, 2024 09:10

## 2024-11-28 NOTE — DVH
CLINICAL INFORMATION:  64 years old, Female; pneumonia. 



TECHNIQUE:  Single AP portable chest radiograph was obtained.



COMPARISON: XY CHEST PORTABLE on DOS: 11/26/24



FINDINGS:



Mild atelectasis in the right lung base. Atelectasis and/or consolidation in the left lung base appea
rs unchanged. Unchanged cardiomegaly and mild prominence of the pulmonary vasculature. No pneumothora
x. No other significant interval change.



IMPRESSION: 



1. Atelectasis in the lung bases with possible consolidation in the left lung base, unchanged.



2. Cardiomegaly and mild prominence of the pulmonary vasculature May suggest a degree of pulmonary va
scular congestion in the appropriate clinical setting. Appears similar compared to the prior exam. 



 



Electronically Signed by: Hector Kincaid at 11/28/2024 10:21:37 AM

## 2024-11-29 VITALS
TEMPERATURE: 98.5 F | DIASTOLIC BLOOD PRESSURE: 68 MMHG | OXYGEN SATURATION: 93 % | RESPIRATION RATE: 14 BRPM | SYSTOLIC BLOOD PRESSURE: 115 MMHG | HEART RATE: 113 BPM

## 2024-11-29 VITALS
TEMPERATURE: 98.5 F | SYSTOLIC BLOOD PRESSURE: 130 MMHG | RESPIRATION RATE: 17 BRPM | OXYGEN SATURATION: 95 % | DIASTOLIC BLOOD PRESSURE: 77 MMHG | HEART RATE: 99 BPM

## 2024-11-29 VITALS
OXYGEN SATURATION: 95 % | SYSTOLIC BLOOD PRESSURE: 121 MMHG | HEART RATE: 99 BPM | DIASTOLIC BLOOD PRESSURE: 71 MMHG | RESPIRATION RATE: 19 BRPM

## 2024-11-29 VITALS
HEART RATE: 95 BPM | OXYGEN SATURATION: 92 % | SYSTOLIC BLOOD PRESSURE: 126 MMHG | RESPIRATION RATE: 13 BRPM | DIASTOLIC BLOOD PRESSURE: 81 MMHG | TEMPERATURE: 98.1 F

## 2024-11-29 VITALS
RESPIRATION RATE: 14 BRPM | HEART RATE: 100 BPM | OXYGEN SATURATION: 95 % | DIASTOLIC BLOOD PRESSURE: 71 MMHG | SYSTOLIC BLOOD PRESSURE: 121 MMHG

## 2024-11-29 VITALS
OXYGEN SATURATION: 95 % | RESPIRATION RATE: 14 BRPM | SYSTOLIC BLOOD PRESSURE: 131 MMHG | HEART RATE: 89 BPM | DIASTOLIC BLOOD PRESSURE: 82 MMHG

## 2024-11-29 VITALS
DIASTOLIC BLOOD PRESSURE: 80 MMHG | RESPIRATION RATE: 14 BRPM | HEART RATE: 96 BPM | SYSTOLIC BLOOD PRESSURE: 129 MMHG | OXYGEN SATURATION: 93 %

## 2024-11-29 VITALS
DIASTOLIC BLOOD PRESSURE: 84 MMHG | TEMPERATURE: 98.6 F | HEART RATE: 92 BPM | RESPIRATION RATE: 12 BRPM | OXYGEN SATURATION: 94 % | SYSTOLIC BLOOD PRESSURE: 129 MMHG

## 2024-11-29 VITALS
HEART RATE: 92 BPM | SYSTOLIC BLOOD PRESSURE: 126 MMHG | RESPIRATION RATE: 14 BRPM | DIASTOLIC BLOOD PRESSURE: 79 MMHG | OXYGEN SATURATION: 95 %

## 2024-11-29 VITALS
DIASTOLIC BLOOD PRESSURE: 97 MMHG | SYSTOLIC BLOOD PRESSURE: 139 MMHG | HEART RATE: 97 BPM | OXYGEN SATURATION: 94 % | RESPIRATION RATE: 18 BRPM

## 2024-11-29 VITALS — OXYGEN SATURATION: 93 % | HEART RATE: 92 BPM | RESPIRATION RATE: 18 BRPM

## 2024-11-29 VITALS
SYSTOLIC BLOOD PRESSURE: 123 MMHG | OXYGEN SATURATION: 95 % | DIASTOLIC BLOOD PRESSURE: 87 MMHG | HEART RATE: 101 BPM | RESPIRATION RATE: 18 BRPM

## 2024-11-29 VITALS
SYSTOLIC BLOOD PRESSURE: 124 MMHG | OXYGEN SATURATION: 95 % | HEART RATE: 99 BPM | RESPIRATION RATE: 17 BRPM | DIASTOLIC BLOOD PRESSURE: 79 MMHG

## 2024-11-29 VITALS
HEART RATE: 98 BPM | SYSTOLIC BLOOD PRESSURE: 126 MMHG | RESPIRATION RATE: 16 BRPM | OXYGEN SATURATION: 96 % | DIASTOLIC BLOOD PRESSURE: 88 MMHG

## 2024-11-29 VITALS
OXYGEN SATURATION: 93 % | DIASTOLIC BLOOD PRESSURE: 71 MMHG | HEART RATE: 107 BPM | SYSTOLIC BLOOD PRESSURE: 131 MMHG | RESPIRATION RATE: 22 BRPM

## 2024-11-29 VITALS
HEART RATE: 96 BPM | DIASTOLIC BLOOD PRESSURE: 81 MMHG | SYSTOLIC BLOOD PRESSURE: 132 MMHG | OXYGEN SATURATION: 96 % | RESPIRATION RATE: 12 BRPM

## 2024-11-29 VITALS
DIASTOLIC BLOOD PRESSURE: 67 MMHG | SYSTOLIC BLOOD PRESSURE: 110 MMHG | RESPIRATION RATE: 16 BRPM | OXYGEN SATURATION: 93 % | HEART RATE: 93 BPM

## 2024-11-29 VITALS
OXYGEN SATURATION: 91 % | HEART RATE: 101 BPM | SYSTOLIC BLOOD PRESSURE: 164 MMHG | TEMPERATURE: 98.1 F | DIASTOLIC BLOOD PRESSURE: 90 MMHG

## 2024-11-29 VITALS
HEART RATE: 91 BPM | DIASTOLIC BLOOD PRESSURE: 68 MMHG | OXYGEN SATURATION: 93 % | RESPIRATION RATE: 18 BRPM | SYSTOLIC BLOOD PRESSURE: 115 MMHG

## 2024-11-29 VITALS
SYSTOLIC BLOOD PRESSURE: 136 MMHG | HEART RATE: 93 BPM | DIASTOLIC BLOOD PRESSURE: 82 MMHG | OXYGEN SATURATION: 95 % | RESPIRATION RATE: 11 BRPM

## 2024-11-29 VITALS
RESPIRATION RATE: 14 BRPM | DIASTOLIC BLOOD PRESSURE: 88 MMHG | HEART RATE: 91 BPM | OXYGEN SATURATION: 92 % | SYSTOLIC BLOOD PRESSURE: 126 MMHG

## 2024-11-29 VITALS
SYSTOLIC BLOOD PRESSURE: 128 MMHG | OXYGEN SATURATION: 93 % | RESPIRATION RATE: 17 BRPM | DIASTOLIC BLOOD PRESSURE: 82 MMHG | HEART RATE: 101 BPM

## 2024-11-29 VITALS
OXYGEN SATURATION: 94 % | DIASTOLIC BLOOD PRESSURE: 89 MMHG | SYSTOLIC BLOOD PRESSURE: 129 MMHG | HEART RATE: 94 BPM | RESPIRATION RATE: 12 BRPM

## 2024-11-29 VITALS
HEART RATE: 100 BPM | OXYGEN SATURATION: 95 % | SYSTOLIC BLOOD PRESSURE: 121 MMHG | RESPIRATION RATE: 14 BRPM | DIASTOLIC BLOOD PRESSURE: 71 MMHG

## 2024-11-29 VITALS
SYSTOLIC BLOOD PRESSURE: 126 MMHG | HEART RATE: 96 BPM | OXYGEN SATURATION: 95 % | RESPIRATION RATE: 17 BRPM | DIASTOLIC BLOOD PRESSURE: 79 MMHG

## 2024-11-29 VITALS
HEART RATE: 110 BPM | SYSTOLIC BLOOD PRESSURE: 123 MMHG | DIASTOLIC BLOOD PRESSURE: 76 MMHG | OXYGEN SATURATION: 93 % | RESPIRATION RATE: 20 BRPM

## 2024-11-29 VITALS
SYSTOLIC BLOOD PRESSURE: 128 MMHG | HEART RATE: 100 BPM | DIASTOLIC BLOOD PRESSURE: 82 MMHG | OXYGEN SATURATION: 93 % | RESPIRATION RATE: 14 BRPM

## 2024-11-29 VITALS
RESPIRATION RATE: 13 BRPM | HEART RATE: 90 BPM | DIASTOLIC BLOOD PRESSURE: 77 MMHG | SYSTOLIC BLOOD PRESSURE: 131 MMHG | OXYGEN SATURATION: 95 %

## 2024-11-29 VITALS
HEART RATE: 95 BPM | OXYGEN SATURATION: 94 % | RESPIRATION RATE: 15 BRPM | SYSTOLIC BLOOD PRESSURE: 120 MMHG | DIASTOLIC BLOOD PRESSURE: 70 MMHG

## 2024-11-29 VITALS
RESPIRATION RATE: 13 BRPM | SYSTOLIC BLOOD PRESSURE: 131 MMHG | HEART RATE: 95 BPM | OXYGEN SATURATION: 94 % | DIASTOLIC BLOOD PRESSURE: 71 MMHG

## 2024-11-29 VITALS
OXYGEN SATURATION: 89 % | RESPIRATION RATE: 23 BRPM | DIASTOLIC BLOOD PRESSURE: 59 MMHG | HEART RATE: 111 BPM | SYSTOLIC BLOOD PRESSURE: 78 MMHG

## 2024-11-29 VITALS
HEART RATE: 91 BPM | OXYGEN SATURATION: 95 % | RESPIRATION RATE: 18 BRPM | DIASTOLIC BLOOD PRESSURE: 82 MMHG | SYSTOLIC BLOOD PRESSURE: 131 MMHG

## 2024-11-29 VITALS — RESPIRATION RATE: 16 BRPM | HEART RATE: 94 BPM | OXYGEN SATURATION: 94 %

## 2024-11-29 LAB
ANION GAP SERPL CALCULATED.3IONS-SCNC: 9 MMOL/L (ref 5–15)
APTT PPP: 51.9 SEC (ref 24.5–34.5)
APTT PPP: 56 SEC (ref 24.5–34.5)
APTT PPP: 77.4 SEC (ref 24.5–34.5)
APTT PPP: 87.3 SEC (ref 24.5–34.5)
BASE EXCESS BLDV CALC-SCNC: 5.1 MMOL/L (ref -2–3)
BUN SERPL-MCNC: 31 MG/DL (ref 9–23)
BUN/CREAT SERPL: 29.8 (ref 10–20)
CALCIUM SERPL-MCNC: 10.2 MG/DL (ref 8.7–10.4)
CHLORIDE SERPL-SCNC: 102 MMOL/L (ref 98–107)
CO2 SERPL-SCNC: 30 MMOL/L (ref 20–31)
GLUCOSE SERPL-MCNC: 143 MG/DL (ref 74–106)
INR PPP: 1.05 (ref 0.9–1.15)
INR PPP: 1.06 (ref 0.9–1.15)
INR PPP: 1.08 (ref 0.9–1.15)
POTASSIUM SERPL-SCNC: 4.2 MMOL/L (ref 3.5–5.1)
PROTHROMBIN TIME: 11.1 SEC (ref 9.3–11.8)
PROTHROMBIN TIME: 11.2 SEC (ref 9.3–11.8)
PROTHROMBIN TIME: 11.4 SEC (ref 9.3–11.8)
SODIUM SERPL-SCNC: 141 MMOL/L (ref 136–145)

## 2024-11-29 PROCEDURE — 5A0935A ASSISTANCE WITH RESPIRATORY VENTILATION, LESS THAN 24 CONSECUTIVE HOURS, HIGH NASAL FLOW/VELOCITY: ICD-10-PCS | Performed by: INTERNAL MEDICINE

## 2024-11-29 RX ADMIN — Medication SCH STRIP: at 17:08

## 2024-11-29 RX ADMIN — ACETAMINOPHEN PRN MG: 325 TABLET ORAL at 12:42

## 2024-11-29 RX ADMIN — FUROSEMIDE SCH MG: 10 INJECTION, SOLUTION INTRAMUSCULAR; INTRAVENOUS at 10:39

## 2024-11-29 RX ADMIN — HUMAN INSULIN SCH UNITS: 100 INJECTION, SOLUTION SUBCUTANEOUS at 17:17

## 2024-11-29 RX ADMIN — HEPARIN SODIUM AND DEXTROSE SCH MLS/HR: 10000; 5 INJECTION INTRAVENOUS at 08:44

## 2024-11-29 RX ADMIN — HUMAN INSULIN SCH UNITS: 100 INJECTION, SOLUTION SUBCUTANEOUS at 22:19

## 2024-11-29 RX ADMIN — HEPARIN SODIUM AND DEXTROSE SCH MLS/HR: 10000; 5 INJECTION INTRAVENOUS at 00:18

## 2024-11-29 NOTE — DVHPN2
Consult Progress Note


Date Seen:  Nov 29, 2024


Subjective


Other Systems:  


Patient in normal sinus rhythm on cardiac monitor at time of assessment.


Patient on HiFlow nasal cannula, 60L, FIO2 50%.





Objective


vital signs





                                   Vital Sign








  Date Time  Temp Pulse Resp B/P (MAP) Pulse Ox O2 Delivery O2 Flow Rate FiO2


 


11/29/24 12:20  98 16  96  60.0 50


 


11/29/24 10:39    122/78    


 


11/29/24 10:00      Hi-Flow Heated NC+  


 


11/29/24 08:00 98.5       





 98.5       














                           Total Intake and Output   


 


 11/28/24 11/28/24 11/29/24





 15:00 23:00 07:00


 


Intake Total 404 ml 116 ml 504 ml


 


Balance 404 ml 116 ml 504 ml








medications





                               Current Medications








 Medications  Dose


 Ordered  Sig/Brian


 Route  Start Time


 Stop Time Status Last Admin


Dose Admin


 


 Dextrose  50 ml  UD  PRN


 IV  11/26/24 18:15


     





 


 Aspirin  81 mg  DAILY


 PO  11/27/24 10:00


    11/29/24 10:38


81 MG


 


 Atorvastatin


 Calcium  40 mg  HS


 PO  11/26/24 22:00


    11/28/24 21:33


40 MG


 


 Morphine Sulfate  2 mg  Q30MP  PRN


 IV  11/26/24 18:15


     





 


 Acetaminophen  650 mg  Q6HP  PRN


 PO  11/26/24 18:15


     





 


 Docusate Sodium  100 mg  DAILY


 PO  11/27/24 10:00


    11/29/24 10:38


100 MG


 


 Ondansetron HCl  4 mg  Q4HP  PRN


 IV  11/26/24 18:15


     





 


 Nitroglycerin  0.4 mg  Q5MINP  PRN


 SL  11/26/24 18:15


     





 


 Albuterol  2.5 mg  Q4HR


 NEB  11/26/24 22:00


    11/29/24 09:29


2.5 MG


 


 Methylprednisolone


 Sodium Succinate  40 mg  BID


 IV  11/26/24 22:00


    11/29/24 10:40


40 MG


 


 Ceftriaxone Sodium  50 ml @ 


 100 mls/hr  DAILY@09


 IV  11/27/24 09:00


    11/29/24 09:09


100 MLS/HR


 


 Azithromycin  250 ml @ 


 125 mls/hr  DAILY


 IV  11/27/24 10:00


    11/29/24 10:38


125 MLS/HR


 


 Furosemide  20 mg  DAILY


 IV  11/29/24 10:00


    11/29/24 10:39


20 MG


 


 Heparin Sodium/


 Dextrose  250 ml @ 


 11 mls/hr  [id number]


 IV  11/29/24 08:30


    11/29/24 08:44


11 MLS/HR


 


 Insulin Human


 Regular  MILD


 SLIDING


 SCALE  ACHS


 SC  11/29/24 17:00


     





 


 Diagnostic Test


  (Pha)  1 strip  ACHS


 VI  11/29/24 17:00


     











Examination:  GENERAL:Normal, LUNGS:Abnormal (Diminished bilateral lower lobes),

CVS:Normal, NEURO:Normal


laboratory and microbiology


                                Laboratory Tests


11/29/24 03:30








11/28/24 02:44

















Test


 11/29/24


03:30 Range/Units


 


 


Serum Glucose 143 H   mg/dL











Problem List/Assessment/Plan


Problem List/Assessment/Plan


Bilateral pulmonary emboli with evidence of right heart strain


NSTEMI type II secondary to above


Mild-to-moderate tricuspid valve regurgitation


Acute on chronic hypoxic respiratory failure


Hx of PE in 2009, off Xarelto x 1 mo.


Asthma exacerbation


Acute kidney injury


Suboptimal medical therapy


Medication noncompliance








Plan/Recommendation


(Dr. Sanchez)





Transthoracic echocardiogram reveals EF 55%, RVSP 65 mmHg.  A CT angio chest 

with contrast revealed bilateral pulmonary emboli with evidence of right heart 

strain.  Patient was initiated on a heparin drip.  Patient continues on Hiflow 

nasal cannula with oxygen requirement at 60L, FIO2 50%.  O2 saturations 

maintaining 92-93% on monitor.  Following daily to re-evaluate for the need for 

possible thrombectomy.  Thank you for allowing us to care for this patient. 

Please call with any questions or concerns.





Critical care time:  40 min.  This medical document was created using an 

electronic medical record system with voice recognition software and 

computerized dictation system.  Although this document has been carefully 

reviewed, there might still be some phonetic and typographical errors.  

Occasional wrong-word or ``sound-alike substitutions may have occurred due to 

the inherent limitations of voice recognition software.  These areas are purely 

typographical due to imperfections of the software programs and do not reflect 

any compromise in the patient's medical care.  Please read the chart carefully 

and recognize, using context, where these substitutions have occurred.


Plan discussed with:  Patient, Other (Bedside RN)





Date of Service:  Nov 29, 2024


Billing Provider:  YO SANCHEZ MD


Cardiology Common Codes:  [id number]-[id number] CARE 30-74 MIN











DOC ALVARADO FNBROWN             Nov 29, 2024 13:13

## 2024-11-29 NOTE — DVHPN2
Subjective


Decreasing SOB and chest tightness


Reviewed:  Care Plan, H&P, Labs, Medications, Previous Orders, Radiology, Other 

(Consultation)


Changes from previous H/P or p:  Changes





Objective


Vitals





Vital Signs








  Date Time  Temp Pulse Resp B/P (MAP) Pulse Ox O2 Delivery O2 Flow Rate FiO2


 


11/29/24 10:39    122/78    


 


11/29/24 10:00  95 13  94   


 


11/29/24 10:00      Hi-Flow Heated NC+ 60 60





        60


 


11/29/24 08:00 98.5       





 98.5       








Intake/Output











                               Intake and Output 


 


 11/29/24





 07:00


 


Intake Total 1024 ml


 


Balance 1024 ml


 


 


 


Intake Oral 400 ml


 


IV Total 624 ml


 


# Voids 7








General Appearance:  Alert, Oriented X3, Cooperative, moderate distress


HEENT:  Atraumatic


Lungs:  Other (Decreased air entry bilateral)


Cardiovascular:  Normal S1, Normal S2, Other (Tachycardia)


Abdomen:  Normal bowel sounds, Soft, No tenderness


Neuro:  Normal speech, Cranial nerves 3-12 NL


Psych/Mental Status:  Mental status NL, Mood NL


Medications





                               Current Medications








 Medications  Dose


 Ordered  Sig/Brian


 Route  Start Time


 Stop Time Status Last Admin


Dose Admin


 


 Diagnostic Test


  (Pha)  1 strip  IQ4HR


 VI  11/26/24 20:00


    11/29/24 12:03


1 STRIP


 


 Insulin Human


 Regular    IQ4HR


 SC  11/26/24 20:00


    11/29/24 12:12


2 UNITS


 


 Dextrose  50 ml  UD  PRN


 IV  11/26/24 18:15


     





 


 Aspirin  81 mg  DAILY


 PO  11/27/24 10:00


    11/29/24 10:38


81 MG


 


 Atorvastatin


 Calcium  40 mg  HS


 PO  11/26/24 22:00


    11/28/24 21:33


40 MG


 


 Morphine Sulfate  2 mg  Q30MP  PRN


 IV  11/26/24 18:15


     





 


 Acetaminophen  650 mg  Q6HP  PRN


 PO  11/26/24 18:15


     





 


 Docusate Sodium  100 mg  DAILY


 PO  11/27/24 10:00


    11/29/24 10:38


100 MG


 


 Ondansetron HCl  4 mg  Q4HP  PRN


 IV  11/26/24 18:15


     





 


 Nitroglycerin  0.4 mg  Q5MINP  PRN


 SL  11/26/24 18:15


     





 


 Albuterol  2.5 mg  Q4HR


 NEB  11/26/24 22:00


    11/29/24 09:29


2.5 MG


 


 Methylprednisolone


 Sodium Succinate  40 mg  BID


 IV  11/26/24 22:00


    11/29/24 10:40


40 MG


 


 Ceftriaxone Sodium  50 ml @ 


 100 mls/hr  DAILY@09


 IV  11/27/24 09:00


    11/29/24 09:09


100 MLS/HR


 


 Azithromycin  250 ml @ 


 125 mls/hr  DAILY


 IV  11/27/24 10:00


    11/29/24 10:38


125 MLS/HR


 


 Furosemide  20 mg  DAILY


 IV  11/29/24 10:00


    11/29/24 10:39


20 MG


 


 Heparin Sodium/


 Dextrose  250 ml @ 


 11 mls/hr  [id number]


 IV  11/29/24 08:30


    11/29/24 08:44


11 MLS/HR











Laboratory Results


Laboratory Tests


11/28/24 02:44








11/29/24 03:30











Chemistry








Test


 11/29/24


03:30


 


Calcium Level


 10.2 mg/dL


(8.7-10.4)








Coagulation








Test


 11/28/24


16:14 11/28/24


23:31 11/29/24


05:48


 


Prothrombin Time


 10.9 sec


(9.3-11.8) 11.3 sec


(9.3-11.8) 11.1 sec


(9.3-11.8)


 


Prothrombin Time INR


 1.03


(0.9-1.15) 1.07


(0.9-1.15) 1.05


(0.9-1.15)


 


Activated Partial


Thromboplast Time 48.2 SEC


(24.5-34.5)  H 87.3 SEC


(24.5-34.5)  *H 77.4 SEC


(24.5-34.5)  *H








Blood Gas Results








Test


 11/29/24


09:04


 


Arterial Blood pH


 7.412


(7.350-7.450)


 


FiO2 % 60.0  








Labs and/or images reviewed:  Labs reviewed by me, Image(s) reviewed by me





Assessment/Plan


Assessment/Plan


A 64-year-old female patient; with multiple comorbidities; who presented to 

emergency department with SOB and chest tightness.








#Acute hypoxic respiratory failure due to bilateral pulmonary emboli; reviewed 

the available imaging studies and ABGs; continue oxygen therapy as needed; now 

on high-flow nasal cannula FiO2 of 50%; was on BiPAP; continue close monitoring


#Submassive bilateral pulmonary emboli with RV strain in the setting of history 

of pulmonary embolism 2009; causing SOB and chest tightness; stopped Xarelto 

last month; continue heparin infusion; cardiology is following; reviewed chest 

angiogram and echocardiogram; no DVTs; continue close monitoring


#Chest tightness with NSTEMI type 2; demand ischemia; the setting of RV strain 

secondary to submassive bilateral pulmonary emboli; telemetry; continue aspirin 

and statin; cardiology is following; continue close monitoring


#Essential hypertension; continue antihypertensive medications as indicated; 

continue monitoring


#Hyperglycemia; prediabetic; continue insulin sliding scale and hypoglycemia 

protocol; continue monitoring


#Asthma exacerbation; continue IV steroids with IV antibiotics; continue oxygen 

therapy as above; continue monitoring


#Metabolic syndrome with prediabetes and dyslipidemia along with 

overweight/obesity; counseled the patient on the importance of adopting healthy 

lifestyle with diet and exercise in order to lose weight


#Dyslipidemia; continue statin; reviewed lipid profile; continue monitoring


#Prediabetes; hemoglobin A1c of 6.1%; management as above; continue monitoring


#GOPAL; most likely vasomotor nephropathy in the setting of submassive bilateral 

pulmonary emboli and RV strain; avoid nephrotoxic agents; continue monitoring


#Macrocytosis without anemia; to investigate as outpatient; continue monitoring


#Low TSH; free T4 pending; continue monitoring








55 minutes of critical care time








This medical document was created using an electronic medical record system with

computerized dictation system.  Although this document has been carefully 

reviewed, there might still be some phonetic and typographical errors.  These 

areas are purely typographical due to imperfections of the software programs, 

and do not reflect any compromise in the patient's medical care.


Plan discussed with:  Patient, Other (Nurse)


My Orders





                           Orders - ROBERTO RODNEY MD








Procedure Category Date Status





  Time 


 


1 View Decubitus XY 11/30/24 Logged





Chest Xray  07:00 


 


Heparin Drip/D5w PHA 11/29/24 In Process





100units/Ml  08:30 


 


PTPTT LAB 11/29/24 Logged





  15:00 


 


Abg W/ Co-Ox RT 11/29/24 Logged





  08:50 


 


Complete Blood Count LAB 11/30/24 Verified





  04:00 


 


Magnesium LAB 11/30/24 Verified





  04:00 


 


Comprehensive LAB 11/30/24 Verified





Metabolic Panel  04:00 











Date of Service:  Nov 29, 2024


Billing Provider:  ROBERTO RODNEY MD


Common Visit Codes:  [id number] CARE 30-74 MIN (55 minutes)











ROBERTO RODNEY MD             Nov 29, 2024 12:16

## 2024-11-30 VITALS
DIASTOLIC BLOOD PRESSURE: 67 MMHG | SYSTOLIC BLOOD PRESSURE: 120 MMHG | OXYGEN SATURATION: 90 % | HEART RATE: 101 BPM | RESPIRATION RATE: 19 BRPM

## 2024-11-30 VITALS
HEART RATE: 90 BPM | RESPIRATION RATE: 12 BRPM | SYSTOLIC BLOOD PRESSURE: 130 MMHG | OXYGEN SATURATION: 93 % | DIASTOLIC BLOOD PRESSURE: 91 MMHG

## 2024-11-30 VITALS
HEART RATE: 90 BPM | DIASTOLIC BLOOD PRESSURE: 83 MMHG | SYSTOLIC BLOOD PRESSURE: 141 MMHG | OXYGEN SATURATION: 93 % | RESPIRATION RATE: 10 BRPM

## 2024-11-30 VITALS
OXYGEN SATURATION: 93 % | RESPIRATION RATE: 10 BRPM | SYSTOLIC BLOOD PRESSURE: 137 MMHG | DIASTOLIC BLOOD PRESSURE: 82 MMHG | HEART RATE: 91 BPM

## 2024-11-30 VITALS
OXYGEN SATURATION: 93 % | DIASTOLIC BLOOD PRESSURE: 86 MMHG | SYSTOLIC BLOOD PRESSURE: 135 MMHG | HEART RATE: 89 BPM | RESPIRATION RATE: 12 BRPM

## 2024-11-30 VITALS
SYSTOLIC BLOOD PRESSURE: 127 MMHG | RESPIRATION RATE: 13 BRPM | OXYGEN SATURATION: 93 % | DIASTOLIC BLOOD PRESSURE: 75 MMHG | HEART RATE: 85 BPM

## 2024-11-30 VITALS
HEART RATE: 91 BPM | SYSTOLIC BLOOD PRESSURE: 138 MMHG | RESPIRATION RATE: 20 BRPM | DIASTOLIC BLOOD PRESSURE: 86 MMHG | OXYGEN SATURATION: 95 %

## 2024-11-30 VITALS
SYSTOLIC BLOOD PRESSURE: 129 MMHG | DIASTOLIC BLOOD PRESSURE: 78 MMHG | OXYGEN SATURATION: 95 % | RESPIRATION RATE: 14 BRPM | HEART RATE: 92 BPM

## 2024-11-30 VITALS
DIASTOLIC BLOOD PRESSURE: 66 MMHG | SYSTOLIC BLOOD PRESSURE: 111 MMHG | OXYGEN SATURATION: 93 % | HEART RATE: 84 BPM | RESPIRATION RATE: 11 BRPM

## 2024-11-30 VITALS — RESPIRATION RATE: 16 BRPM | OXYGEN SATURATION: 95 % | HEART RATE: 95 BPM

## 2024-11-30 VITALS — HEART RATE: 97 BPM | RESPIRATION RATE: 12 BRPM | OXYGEN SATURATION: 96 %

## 2024-11-30 VITALS
DIASTOLIC BLOOD PRESSURE: 85 MMHG | SYSTOLIC BLOOD PRESSURE: 138 MMHG | RESPIRATION RATE: 18 BRPM | HEART RATE: 96 BPM | OXYGEN SATURATION: 92 %

## 2024-11-30 VITALS
SYSTOLIC BLOOD PRESSURE: 123 MMHG | RESPIRATION RATE: 15 BRPM | OXYGEN SATURATION: 96 % | HEART RATE: 88 BPM | DIASTOLIC BLOOD PRESSURE: 75 MMHG

## 2024-11-30 VITALS
SYSTOLIC BLOOD PRESSURE: 154 MMHG | DIASTOLIC BLOOD PRESSURE: 94 MMHG | OXYGEN SATURATION: 95 % | HEART RATE: 90 BPM | RESPIRATION RATE: 12 BRPM

## 2024-11-30 VITALS
RESPIRATION RATE: 15 BRPM | DIASTOLIC BLOOD PRESSURE: 94 MMHG | OXYGEN SATURATION: 94 % | SYSTOLIC BLOOD PRESSURE: 136 MMHG | HEART RATE: 94 BPM

## 2024-11-30 VITALS
DIASTOLIC BLOOD PRESSURE: 73 MMHG | RESPIRATION RATE: 12 BRPM | OXYGEN SATURATION: 95 % | SYSTOLIC BLOOD PRESSURE: 118 MMHG | HEART RATE: 95 BPM

## 2024-11-30 VITALS — RESPIRATION RATE: 14 BRPM | SYSTOLIC BLOOD PRESSURE: 114 MMHG | DIASTOLIC BLOOD PRESSURE: 71 MMHG | HEART RATE: 91 BPM

## 2024-11-30 VITALS
HEART RATE: 92 BPM | OXYGEN SATURATION: 95 % | SYSTOLIC BLOOD PRESSURE: 126 MMHG | RESPIRATION RATE: 16 BRPM | DIASTOLIC BLOOD PRESSURE: 81 MMHG

## 2024-11-30 VITALS
DIASTOLIC BLOOD PRESSURE: 90 MMHG | HEART RATE: 87 BPM | RESPIRATION RATE: 11 BRPM | SYSTOLIC BLOOD PRESSURE: 136 MMHG | OXYGEN SATURATION: 94 %

## 2024-11-30 VITALS
OXYGEN SATURATION: 96 % | DIASTOLIC BLOOD PRESSURE: 95 MMHG | RESPIRATION RATE: 14 BRPM | SYSTOLIC BLOOD PRESSURE: 137 MMHG | HEART RATE: 93 BPM

## 2024-11-30 VITALS
OXYGEN SATURATION: 94 % | DIASTOLIC BLOOD PRESSURE: 77 MMHG | RESPIRATION RATE: 16 BRPM | HEART RATE: 88 BPM | SYSTOLIC BLOOD PRESSURE: 120 MMHG

## 2024-11-30 VITALS
RESPIRATION RATE: 10 BRPM | OXYGEN SATURATION: 93 % | DIASTOLIC BLOOD PRESSURE: 87 MMHG | SYSTOLIC BLOOD PRESSURE: 129 MMHG | HEART RATE: 87 BPM

## 2024-11-30 VITALS
HEART RATE: 92 BPM | SYSTOLIC BLOOD PRESSURE: 127 MMHG | RESPIRATION RATE: 18 BRPM | OXYGEN SATURATION: 95 % | DIASTOLIC BLOOD PRESSURE: 78 MMHG

## 2024-11-30 VITALS
SYSTOLIC BLOOD PRESSURE: 128 MMHG | DIASTOLIC BLOOD PRESSURE: 80 MMHG | OXYGEN SATURATION: 94 % | HEART RATE: 92 BPM | RESPIRATION RATE: 12 BRPM

## 2024-11-30 VITALS
SYSTOLIC BLOOD PRESSURE: 139 MMHG | RESPIRATION RATE: 16 BRPM | HEART RATE: 97 BPM | DIASTOLIC BLOOD PRESSURE: 90 MMHG | OXYGEN SATURATION: 89 %

## 2024-11-30 VITALS
HEART RATE: 93 BPM | OXYGEN SATURATION: 94 % | RESPIRATION RATE: 15 BRPM | SYSTOLIC BLOOD PRESSURE: 104 MMHG | DIASTOLIC BLOOD PRESSURE: 74 MMHG

## 2024-11-30 VITALS
TEMPERATURE: 99.2 F | HEART RATE: 87 BPM | DIASTOLIC BLOOD PRESSURE: 84 MMHG | RESPIRATION RATE: 12 BRPM | OXYGEN SATURATION: 93 % | SYSTOLIC BLOOD PRESSURE: 134 MMHG

## 2024-11-30 VITALS
RESPIRATION RATE: 17 BRPM | SYSTOLIC BLOOD PRESSURE: 133 MMHG | HEART RATE: 92 BPM | DIASTOLIC BLOOD PRESSURE: 76 MMHG | OXYGEN SATURATION: 93 %

## 2024-11-30 VITALS
RESPIRATION RATE: 15 BRPM | HEART RATE: 96 BPM | SYSTOLIC BLOOD PRESSURE: 126 MMHG | DIASTOLIC BLOOD PRESSURE: 86 MMHG | OXYGEN SATURATION: 95 %

## 2024-11-30 VITALS
SYSTOLIC BLOOD PRESSURE: 104 MMHG | RESPIRATION RATE: 21 BRPM | HEART RATE: 98 BPM | DIASTOLIC BLOOD PRESSURE: 74 MMHG | OXYGEN SATURATION: 93 %

## 2024-11-30 VITALS
TEMPERATURE: 98.7 F | SYSTOLIC BLOOD PRESSURE: 126 MMHG | DIASTOLIC BLOOD PRESSURE: 81 MMHG | HEART RATE: 100 BPM | RESPIRATION RATE: 14 BRPM | OXYGEN SATURATION: 95 %

## 2024-11-30 VITALS
SYSTOLIC BLOOD PRESSURE: 123 MMHG | DIASTOLIC BLOOD PRESSURE: 75 MMHG | RESPIRATION RATE: 20 BRPM | HEART RATE: 93 BPM | OXYGEN SATURATION: 93 %

## 2024-11-30 VITALS
OXYGEN SATURATION: 95 % | SYSTOLIC BLOOD PRESSURE: 137 MMHG | RESPIRATION RATE: 13 BRPM | HEART RATE: 94 BPM | DIASTOLIC BLOOD PRESSURE: 80 MMHG

## 2024-11-30 VITALS
DIASTOLIC BLOOD PRESSURE: 64 MMHG | OXYGEN SATURATION: 91 % | SYSTOLIC BLOOD PRESSURE: 119 MMHG | HEART RATE: 106 BPM | RESPIRATION RATE: 19 BRPM

## 2024-11-30 VITALS
HEART RATE: 89 BPM | SYSTOLIC BLOOD PRESSURE: 128 MMHG | DIASTOLIC BLOOD PRESSURE: 77 MMHG | OXYGEN SATURATION: 93 % | RESPIRATION RATE: 14 BRPM

## 2024-11-30 VITALS
OXYGEN SATURATION: 93 % | SYSTOLIC BLOOD PRESSURE: 129 MMHG | RESPIRATION RATE: 12 BRPM | DIASTOLIC BLOOD PRESSURE: 84 MMHG | HEART RATE: 89 BPM

## 2024-11-30 VITALS
HEART RATE: 94 BPM | SYSTOLIC BLOOD PRESSURE: 126 MMHG | RESPIRATION RATE: 14 BRPM | OXYGEN SATURATION: 94 % | DIASTOLIC BLOOD PRESSURE: 81 MMHG

## 2024-11-30 VITALS
DIASTOLIC BLOOD PRESSURE: 76 MMHG | SYSTOLIC BLOOD PRESSURE: 133 MMHG | HEART RATE: 94 BPM | OXYGEN SATURATION: 95 % | RESPIRATION RATE: 19 BRPM

## 2024-11-30 VITALS
HEART RATE: 94 BPM | OXYGEN SATURATION: 94 % | SYSTOLIC BLOOD PRESSURE: 117 MMHG | DIASTOLIC BLOOD PRESSURE: 76 MMHG | RESPIRATION RATE: 15 BRPM

## 2024-11-30 VITALS
SYSTOLIC BLOOD PRESSURE: 145 MMHG | DIASTOLIC BLOOD PRESSURE: 74 MMHG | HEART RATE: 92 BPM | RESPIRATION RATE: 10 BRPM | OXYGEN SATURATION: 97 %

## 2024-11-30 VITALS
SYSTOLIC BLOOD PRESSURE: 149 MMHG | RESPIRATION RATE: 11 BRPM | OXYGEN SATURATION: 95 % | DIASTOLIC BLOOD PRESSURE: 100 MMHG | HEART RATE: 90 BPM

## 2024-11-30 VITALS
DIASTOLIC BLOOD PRESSURE: 88 MMHG | OXYGEN SATURATION: 92 % | RESPIRATION RATE: 12 BRPM | TEMPERATURE: 98.4 F | HEART RATE: 89 BPM | SYSTOLIC BLOOD PRESSURE: 139 MMHG

## 2024-11-30 VITALS — OXYGEN SATURATION: 96 % | RESPIRATION RATE: 16 BRPM

## 2024-11-30 VITALS
HEART RATE: 89 BPM | SYSTOLIC BLOOD PRESSURE: 150 MMHG | DIASTOLIC BLOOD PRESSURE: 82 MMHG | RESPIRATION RATE: 12 BRPM | OXYGEN SATURATION: 94 %

## 2024-11-30 VITALS — OXYGEN SATURATION: 96 % | HEART RATE: 97 BPM | RESPIRATION RATE: 15 BRPM

## 2024-11-30 VITALS
OXYGEN SATURATION: 90 % | HEART RATE: 93 BPM | DIASTOLIC BLOOD PRESSURE: 88 MMHG | RESPIRATION RATE: 11 BRPM | SYSTOLIC BLOOD PRESSURE: 141 MMHG

## 2024-11-30 VITALS
OXYGEN SATURATION: 88 % | DIASTOLIC BLOOD PRESSURE: 109 MMHG | RESPIRATION RATE: 19 BRPM | SYSTOLIC BLOOD PRESSURE: 128 MMHG | HEART RATE: 91 BPM

## 2024-11-30 VITALS
RESPIRATION RATE: 13 BRPM | TEMPERATURE: 98.7 F | HEART RATE: 89 BPM | SYSTOLIC BLOOD PRESSURE: 140 MMHG | OXYGEN SATURATION: 93 % | DIASTOLIC BLOOD PRESSURE: 83 MMHG

## 2024-11-30 VITALS
RESPIRATION RATE: 16 BRPM | HEART RATE: 88 BPM | OXYGEN SATURATION: 93 % | SYSTOLIC BLOOD PRESSURE: 125 MMHG | DIASTOLIC BLOOD PRESSURE: 82 MMHG

## 2024-11-30 VITALS
RESPIRATION RATE: 16 BRPM | TEMPERATURE: 98 F | HEART RATE: 95 BPM | DIASTOLIC BLOOD PRESSURE: 90 MMHG | SYSTOLIC BLOOD PRESSURE: 136 MMHG | OXYGEN SATURATION: 96 %

## 2024-11-30 VITALS
OXYGEN SATURATION: 96 % | RESPIRATION RATE: 16 BRPM | SYSTOLIC BLOOD PRESSURE: 121 MMHG | TEMPERATURE: 98.4 F | HEART RATE: 103 BPM | DIASTOLIC BLOOD PRESSURE: 76 MMHG

## 2024-11-30 VITALS
RESPIRATION RATE: 11 BRPM | DIASTOLIC BLOOD PRESSURE: 97 MMHG | SYSTOLIC BLOOD PRESSURE: 149 MMHG | HEART RATE: 90 BPM | OXYGEN SATURATION: 94 %

## 2024-11-30 VITALS
SYSTOLIC BLOOD PRESSURE: 131 MMHG | HEART RATE: 90 BPM | DIASTOLIC BLOOD PRESSURE: 84 MMHG | OXYGEN SATURATION: 93 % | RESPIRATION RATE: 12 BRPM

## 2024-11-30 VITALS
OXYGEN SATURATION: 92 % | HEART RATE: 87 BPM | RESPIRATION RATE: 17 BRPM | DIASTOLIC BLOOD PRESSURE: 73 MMHG | SYSTOLIC BLOOD PRESSURE: 117 MMHG

## 2024-11-30 VITALS — OXYGEN SATURATION: 95 % | RESPIRATION RATE: 14 BRPM

## 2024-11-30 VITALS
DIASTOLIC BLOOD PRESSURE: 88 MMHG | HEART RATE: 89 BPM | OXYGEN SATURATION: 93 % | RESPIRATION RATE: 12 BRPM | SYSTOLIC BLOOD PRESSURE: 135 MMHG

## 2024-11-30 VITALS
DIASTOLIC BLOOD PRESSURE: 82 MMHG | SYSTOLIC BLOOD PRESSURE: 133 MMHG | RESPIRATION RATE: 16 BRPM | HEART RATE: 95 BPM | OXYGEN SATURATION: 90 %

## 2024-11-30 VITALS — OXYGEN SATURATION: 96 %

## 2024-11-30 VITALS
HEART RATE: 86 BPM | SYSTOLIC BLOOD PRESSURE: 135 MMHG | OXYGEN SATURATION: 95 % | RESPIRATION RATE: 14 BRPM | DIASTOLIC BLOOD PRESSURE: 87 MMHG

## 2024-11-30 VITALS — RESPIRATION RATE: 12 BRPM | HEART RATE: 94 BPM | OXYGEN SATURATION: 96 %

## 2024-11-30 VITALS
SYSTOLIC BLOOD PRESSURE: 117 MMHG | RESPIRATION RATE: 23 BRPM | OXYGEN SATURATION: 94 % | DIASTOLIC BLOOD PRESSURE: 72 MMHG | HEART RATE: 99 BPM

## 2024-11-30 VITALS
DIASTOLIC BLOOD PRESSURE: 83 MMHG | OXYGEN SATURATION: 93 % | HEART RATE: 88 BPM | SYSTOLIC BLOOD PRESSURE: 135 MMHG | RESPIRATION RATE: 24 BRPM

## 2024-11-30 VITALS
SYSTOLIC BLOOD PRESSURE: 136 MMHG | DIASTOLIC BLOOD PRESSURE: 85 MMHG | OXYGEN SATURATION: 94 % | RESPIRATION RATE: 14 BRPM | HEART RATE: 93 BPM

## 2024-11-30 VITALS
HEART RATE: 88 BPM | DIASTOLIC BLOOD PRESSURE: 89 MMHG | SYSTOLIC BLOOD PRESSURE: 130 MMHG | RESPIRATION RATE: 14 BRPM | OXYGEN SATURATION: 95 %

## 2024-11-30 VITALS
SYSTOLIC BLOOD PRESSURE: 129 MMHG | RESPIRATION RATE: 16 BRPM | OXYGEN SATURATION: 94 % | DIASTOLIC BLOOD PRESSURE: 82 MMHG | HEART RATE: 92 BPM

## 2024-11-30 VITALS
HEART RATE: 96 BPM | DIASTOLIC BLOOD PRESSURE: 84 MMHG | OXYGEN SATURATION: 92 % | SYSTOLIC BLOOD PRESSURE: 130 MMHG | RESPIRATION RATE: 18 BRPM

## 2024-11-30 VITALS
OXYGEN SATURATION: 94 % | RESPIRATION RATE: 11 BRPM | HEART RATE: 91 BPM | SYSTOLIC BLOOD PRESSURE: 120 MMHG | DIASTOLIC BLOOD PRESSURE: 87 MMHG

## 2024-11-30 VITALS — SYSTOLIC BLOOD PRESSURE: 119 MMHG | RESPIRATION RATE: 11 BRPM | HEART RATE: 87 BPM | DIASTOLIC BLOOD PRESSURE: 70 MMHG

## 2024-11-30 VITALS
SYSTOLIC BLOOD PRESSURE: 126 MMHG | OXYGEN SATURATION: 95 % | DIASTOLIC BLOOD PRESSURE: 81 MMHG | RESPIRATION RATE: 14 BRPM | HEART RATE: 90 BPM

## 2024-11-30 VITALS
HEART RATE: 95 BPM | SYSTOLIC BLOOD PRESSURE: 136 MMHG | RESPIRATION RATE: 15 BRPM | DIASTOLIC BLOOD PRESSURE: 94 MMHG | OXYGEN SATURATION: 96 %

## 2024-11-30 VITALS — RESPIRATION RATE: 16 BRPM | HEART RATE: 94 BPM | OXYGEN SATURATION: 96 %

## 2024-11-30 VITALS
SYSTOLIC BLOOD PRESSURE: 137 MMHG | OXYGEN SATURATION: 93 % | DIASTOLIC BLOOD PRESSURE: 77 MMHG | HEART RATE: 101 BPM | RESPIRATION RATE: 20 BRPM

## 2024-11-30 VITALS
DIASTOLIC BLOOD PRESSURE: 87 MMHG | RESPIRATION RATE: 12 BRPM | SYSTOLIC BLOOD PRESSURE: 137 MMHG | HEART RATE: 91 BPM | OXYGEN SATURATION: 94 %

## 2024-11-30 VITALS — RESPIRATION RATE: 12 BRPM | OXYGEN SATURATION: 92 %

## 2024-11-30 VITALS — RESPIRATION RATE: 17 BRPM | HEART RATE: 87 BPM | DIASTOLIC BLOOD PRESSURE: 85 MMHG | SYSTOLIC BLOOD PRESSURE: 136 MMHG

## 2024-11-30 VITALS
HEART RATE: 90 BPM | RESPIRATION RATE: 13 BRPM | OXYGEN SATURATION: 97 % | SYSTOLIC BLOOD PRESSURE: 148 MMHG | DIASTOLIC BLOOD PRESSURE: 73 MMHG

## 2024-11-30 LAB
ALBUMIN SERPL-MCNC: 3.8 G/DL (ref 3.2–4.8)
ALP SERPL-CCNC: 65 U/L (ref 46–116)
ALT SERPL-CCNC: 17 U/L (ref 7–40)
ANION GAP SERPL CALCULATED.3IONS-SCNC: 9 MMOL/L (ref 5–15)
APTT PPP: 50.7 SEC (ref 24.5–34.5)
BILIRUB SERPL-MCNC: 0.6 MG/DL (ref 0.2–1)
BUN SERPL-MCNC: 32 MG/DL (ref 9–23)
BUN/CREAT SERPL: 32 (ref 10–20)
CALCIUM SERPL-MCNC: 10 MG/DL (ref 8.7–10.4)
CHLORIDE SERPL-SCNC: 100 MMOL/L (ref 98–107)
CO2 SERPL-SCNC: 31 MMOL/L (ref 20–31)
GLUCOSE SERPL-MCNC: 142 MG/DL (ref 74–106)
HCT VFR BLD AUTO: 45.1 % (ref 36–46)
HGB BLD-MCNC: 15.3 G/DL (ref 12.2–16.2)
INR PPP: 1.08 (ref 0.9–1.15)
MAGNESIUM SERPL-MCNC: 1.8 MG/DL (ref 1.6–2.6)
MCH RBC QN AUTO: 34.3 PG (ref 28–32)
MCV RBC AUTO: 101.1 FL (ref 80–100)
NRBC BLD QL AUTO: 0.2 %
POTASSIUM SERPL-SCNC: 4.3 MMOL/L (ref 3.5–5.1)
PROT SERPL-MCNC: 6 G/DL (ref 5.7–8.2)
PROTHROMBIN TIME: 11.4 SEC (ref 9.3–11.8)
SODIUM SERPL-SCNC: 140 MMOL/L (ref 136–145)

## 2024-11-30 PROCEDURE — 5A0935A ASSISTANCE WITH RESPIRATORY VENTILATION, LESS THAN 24 CONSECUTIVE HOURS, HIGH NASAL FLOW/VELOCITY: ICD-10-PCS | Performed by: INTERNAL MEDICINE

## 2024-11-30 PROCEDURE — 5A0945A ASSISTANCE WITH RESPIRATORY VENTILATION, 24-96 CONSECUTIVE HOURS, HIGH NASAL FLOW/VELOCITY: ICD-10-PCS | Performed by: INTERNAL MEDICINE

## 2024-11-30 RX ADMIN — Medication PRN MG: at 00:05

## 2024-11-30 NOTE — DVH
CHEST RADIOGRAPH



Indication: Follow up onrespiratory distress.



Technique: Single frontal view of the chest was obtained



Comparison: XY CHEST PORTABLE on DOS: 11/28/24, XY CHEST PORTABLE on DOS: 11/26/24



IMPRESSION: 



There is enlargement of the cardiac silhouette and pulmonary arteries. The right lung appears clear. 
 Obscuration of the left hemidiaphragm May relate to effusion or atelectasis, consolidation. No pneum
othorax.  Similar appearance to prior examination.



Electronically Signed by: Margy Morris at 11/30/2024 05:53:45 AM

## 2024-11-30 NOTE — DVHINCON2
Date of service:  2024


Referring Physician


Jeff Alston MD





Reason for Consultation


Dyspnea, Acute hypoxic respiratory failure, pulmonary emboli w/ RV strain, 

asthma exacerbation





History of Present Illness


A 64-year-old woman with PMHx of asthma and hypertension who presented to the ED

on 24 with c/o shortness of breath. Patient was placed on NRB for sats in 

the high 80s, then placed on NRB for increased WOB. Patient reports she was 

camping 5 days ago and her shortness of breath occurred before the camping trip 

in [facility]. Patient's  reports that he found her face down on the floor 

but was conscious. Patient reports she is frequently in and out of the hospital 

for her asthma. She does not use oxygen at home. She is compliant with her 

medications. Denies chest pain, fever, chills, abdominal pain, or weakness. 

Patient was admitted for further care and pulmonary consultation is requested 

for evaluation and management d/t above findings.





Review of Systems:


14-point review of systems negative unless otherwise noted above.





Past Medical History:


HTN, asthma





Past Surgical History:








Medications:


Reviewed.





Allergies:


No known drug allergies.





Family History:


No family history of premature CAD. No family history of lung disorders.





Social History:


Ex-smoker. Reports a remote history of tobacco use, quit over 30 years ago


No alcohol or illicit drug use.





Family History:  


Patient reports no known family medical history.





Allergies:  


Coded Allergies:  


     NO KNOWN ALLERGIES (Unverified , 23)


Home Meds


Active Scripts


Mometasone Furoate-Formoterol (Dulera) 1 Aer Aer, 2 PUFF INH BID, #13 GRAMS 2 

Refills


   Prov:ROXANNE NEWMAN MD         10/28/24


Fluticasone Propionate (Nasal) (Fluticasone Propionate) 50 Mcg/Act Spr, 50 MCG 

NA BID for 30 Days, #2 SPR


   Prov:ROXANNE NEWMAN MD         10/28/24


Prednisone (Prednisone) 20 Mg Tab, 40 MG PO DAILY for 13 Days, #26 TAB


   Take 2 pills once daily for 5 days in case of exacerbation


   Prov:ROXANNE NEWMAN MD         10/28/24


Montelukast Sodium (Singulair) 10 Mg Tab, 10 MG PO HS for 30 Days, #30 TAB 1 

Refill


   Prov:ROXANNE NEWMAN MD         10/28/24


Famotidine (Famotidine) 20 Mg Tab, 20 MG PO DAILY for 30 Days, #30 TAB 1 Refill


   Prov:ROXANNE NEWMAN MD         10/28/24


Reported Medications


Rivaroxaban (Xarelto) 10 Mg Tab, 10 MG PO, TAB


   24


Fluoxetine HCl (Pmdd) (Fluoxetine HCl) 20 Mg Tab, 20 PO, TAB


   24


Acetaminophen (Tylenol Extra Strength) 500 Mg Tab, 500 MG PO, TAB


   24


Quetiapine Fumerate (QUETIAPINE FUMARATE) 300 Mg Tab, 600 MG PO HS, TAB


   10/26/24


Amlodipine Besylate (Amlodipine Besylate) 5 Mg Tab, 1 TAB PO DAILY


   10/26/24


Hctz (Hydrochlorothiazide) 25 Mg Tab, 1 TAB PO DAILY


   10/26/24





Vital Signs





                                   Vital Signs








  Date Time  Temp Pulse Resp B/P (MAP) Pulse Ox O2 Delivery O2 Flow Rate FiO2


 


24 22:03  93 20  93  55.0 50


 


24 22:00      Hi-Flow Heated NC+  


 


24 18:00    133/76 (95)    


 


24 15:30 98.7       





 98.7       








Physical Exam


Gen.: Patient lying in bed in no apparent distress. On supplemental oxygen.


Head: Normocephalic, atraumatic.


Eyes: EOMI/PERRLA.


Ears: Normal hearing. Normal anatomy.


Neck/trachea: Trachea midline, supple.


Nose: Normal external anatomy.


Mouth: Moist mucous membranes.


Chest: Decreased air entry bilaterally. No wheezing or rhonchi.


Cardiovascular: Positive S1, positive S2. Regular rate and rhythm.


Abdomen: Positive bowel sounds in all 4 quadrants. Soft, non-tender, non-

distended.


: Deferred.


Rectal: Deferred.


Skin: Warm, dry. Intact.


Extremities: 2+ radial pulses bilaterally. No lower extremity edema.


Neuro: Awake, alert, oriented x3. No gross motor or sensory deficits. Cranial 

nerves II through XII intact. Gait not assessed.





Labs/Diagnostic Data





                                      Labs








Test


 24


21:15 24


04:44 24


00:49 24


09:04 Range/Units


 


 


POC Glucose 121 H      mg/dl


 


White Blood Count


 


 7.5 


 


 


 4.4-10.8


10^3/uL


 


Red Blood Count


 


 4.46 


 


 


 4.0-5.20


10^6/uL


 


Hemoglobin  15.3    12.2-16.2  g/dL


 


Hematocrit  45.1    36.0-46.0  %


 


Mean Corpuscular Volume  101.1 H   80.0-100.0  fL


 


Mean Corpuscular Hemoglobin  34.3 H   28.0-32.0  pg


 


Mean Corpuscular Hemoglobin


Concent 


 33.9 


 


 


 32.0-36.0  g/dL





 


Red Cell Distribution Width  15.3 H   11.8-14.3  %


 


Platelet Count


 


 258 


 


 


 140-450


10^3/uL


 


Mean Platelet Volume  8.8    6.9-10.8  fL


 


Neutrophils (%) (Auto)  92.2 H   37.0-80.0  %


 


Lymphocytes (%) (Auto)  4.1 L   10.0-50.0  %


 


Monocytes (%) (Auto)  3.5    0.0-12.0  %


 


Eosinophils (%) (Auto)  0.0    0.0-7.0  %


 


Basophils (%) (Auto)  0.2    0.0-2.0  %


 


Neutrophils # (Auto)


 


 6.9 


 


 


 1.6-8.6  10


^3/uL


 


Lymphocytes # (Auto)


 


 0.3 L


 


 


 0.4-5.4  10


^3/uL


 


Monocytes # (Auto)  0.3    0-1.3  10 ^3/uL


 


Eosinophils # (Auto)  0    0-0.8  10 ^3/uL


 


Basophils # (Auto)  0    0-0.2  10 ^3/uL


 


Nucleated Red Blood Cells  0.2     %


 


Sodium Level  140    136-145  mmol/L


 


Potassium Level  4.3    3.5-5.1  mmol/L


 


Chloride Level  100      mmol/L


 


Carbon Dioxide Level  31    20-31  mmol/L


 


Anion Gap  9    5-15  


 


Blood Urea Nitrogen  32 H   9-23  mg/dL


 


Creatinine


 


 1.00 


 


 


 0.550-1.02


mg/dL


 


Glomerular Filtration Rate


Calc 


 63 


 


 


 >90  mL/min





 


BUN/Creatinine Ratio  32.0 H   10.0-20.0  


 


Serum Glucose  142 H     mg/dL


 


Calcium Level  10.0    8.7-10.4  mg/dL


 


Magnesium Level  1.8    1.6-2.6  mg/dL


 


Total Bilirubin  0.6    0.2-1.0  mg/dL


 


Aspartate Amino Transferase


(AST) 


 15 


 


 


 13-40  U/L





 


Alanine Aminotransferase (ALT)  17    7-40  U/L


 


Alkaline Phosphatase  65      U/L


 


Total Protein  6.0    5.7-8.2  g/dL


 


Albumin  3.8    3.2-4.8  g/dL


 


Prothrombin Time   11.4   9.3-11.8  sec


 


Prothrombin Time INR   1.08   0.9-1.15  


 


Activated Partial


Thromboplast Time 


 


 50.7 H


 


 24.5-34.5  SEC





 


Blood Gas Specimen Type    Arterial   


 


Blood Gas Sample Site    Right radial   


 


Blood Gas Patient Temperature    37.0   


 


Arterial Blood Date Drawn    [id number]   


 


Arterial Blood pH    7.412  7.350-7.450  


 


Arterial Blood Partial


Pressure CO2 


 


 


 49.7 H


 32.0-45.0  mmHg





 


Arterial Blood Partial


Pressure O2 


 


 


 86.3 


 83.0-108.0


mmHg


 


Arterial Blood HCO3


 


 


 


 30.9 H


 21.0-28.0


mmol/L


 


Arterial Blood Oxygen


Saturation 


 


 


 95.8 


 94.0-98.0  %





 


Arterial Blood Base Excess


 


 


 


 5.1 H


 -2.0-3.0


mmol/L


 


Arterial Blood Oxyhemoglobin    94.6  94.0-98.0  %


 


Arterial Blood


Carboxyhemoglobin 


 


 


 1.0 


 0.5-1.5  %





 


Arterial Blood Methemoglobin    0.3  0.0-1.5  %


 


Erick Test    Yes   


 


Blood Gas Total Hemoglobin    15.50  12.0-16.0  g/dL


 


Blood Gas Liter Flow    60.00   


 


Blood Gas Modality    High flow   


 


FiO2 %    60.0   


 


Test


 24


02:44 24


10:49 24


10:15 24


08:55 Range/Units


 


 


Free Thyroxine (T4) Calculated


 1.06 


 


 


 


 0.89-1.76


ng/dL


 


Differential Total Cells


Counted 


 100.0 


 


 


 100  





 


Neutrophils % (Manual)  78    37.0-80.0  


 


Band Neutrophils % (Manual)  0     


 


Lymphocytes % (Manual)  14    10.0-50.0  


 


Monocytes % (Manual)  8    0-12  


 


Eosinophils % (Manual)  0    0-7  


 


Basophils % (Manual)  0    0.0-2.0  


 


Metamyelocytes % (manual)  0     


 


Myelocytes % (Manual)  0     


 


Promyelocytes % (Manual)  0     


 


Blast Cells % (Manual)  0     


 


Reactive Lymphocytes  0     


 


Platelet Estimate  Adequate     


 


Influenza Type A Antigen   Negative   Negative  


 


Influenza Type B Antigen   Negative   Negative  


 


SARS-CoV-2 Antigen (Rapid)   Negative   NEGATIVE  


 


Troponin I High Sensitivity    533 *H </=34  ng/L


 


Test


 24


06:20 24


03:51 24


16:21 


 Range/Units


 


 


Blood Gas Critical Value Read


Back Yes 


 


 


 


  





 


Blood Gas Notified Whom Np tonny      


 


Blood Gas Notified Time [id number]      


 


Blood Gas Notified By


 Rrt


brentparker 


 


 


  





 


Hemoglobin A1c  6.1 H   <5.7  % A1C


 


B-Type Natriuretic Peptide  747.58    0-100  pg/mL


 


Triglycerides Level  96    < 150  mg/dL


 


Cholesterol Level  195    < 200  mg/dL


 


LDL Cholesterol  95    < 100  mg/dL


 


HDL Cholesterol  80 H   40-59  mg/dL


 


Thyroid Stimulating Hormone


(TSH) 


 0.42 L


 


 


 0.55-4.78


uIU/mL


 


Venous Blood pH   7.359   7.320-7.430  


 


Venous Blood pCO2 at Patient


Temp 


 


 47.9 


 


 38.0-54.0  mmHg





 


Venous Blood pO2 at Patient


Temp 


 


 54.8 H


 


 23.0-48.0  mmHg





 


Venous Blood HCO3


 


 


 26.4 


 


 22.0-29.0


mmol/L


 


Venous Blood Base Excess


 


 


 0.3 


 


 -2.0-3.0


mmol/L


 


Blood Gas Set Respiration Rate   12.0    


 


Blood Gas Pressure Support   7    


 


Blood Gas EPAP   5    


 


Blood Gas IPAP   12    








                                  Microbiology








 Date/Time


Source Procedure


Growth Status





 


 24 00:00


Nose MRSA Screen - Final


 Complete











Assessment


Impression:


Acute hypoxic respiratory failure


Pulmonary emboli w/ RV strain


Asthma exacerbation


Atelectasis.


Hx of nicotine dependence


Obesity, BMI 35.3





Plan:


Supplemental oxygen


On high flow O2 at flow rate 50 LPM , FiO2 50%.


Titrate to keep O2 sats above 92%.


Taper O2 as tolerated.





Continue bronchodilators.


Continue antibiotics


IV steroids





Heparin drip.





Monitor renal function.


Monitor electrolytes. Supplement as necessary.


Monitor ins and outs.





DVT prophylaxis.





Prognosis: Poor given patient's multiple co-morbidities.


Condition: Critical





Rest of plan per hospitalist and other consultants.





A total of 36 minutes of critical care time was spent reviewing the patient 

record, examining the patient, making a diagnostic and therapeutic plan, 

discussing this plan with the medical personnel, following up on diagnostic 

studies and following the patient for clinical stability excluding any and all 

procedures.  At least 50% of this time was spent in direct, face-to-face 

contact.





Thank you Dr. Jeff Alston MD, for allowing me to participate in this 

patient's care.


Further recommendations will depend on the patient's clinical course.


Please do not hesitate to contact me if you have any questions or concerns.





This medical document was created using an electronic medical record system with

Dragon computerized dictation system. Although these documentations are being 

carefully reviewed, there may still be some phonetic and typographical changes. 

The errors are purely typographical, due to imperfection on the software 

program, and do not reflect any compromise in the patient's medical care.


Plan discussed with:  Patient, Other (MARLEN Freitas MD)











MIYA KHALIL MD             2024 23:32

## 2024-11-30 NOTE — DVHPN2
Subjective


Continue decreasing SOB and chest tightness


Reviewed:  Care Plan, H&P, Labs, Medications, Previous Orders, Radiology, Other 

(Consultation)


Changes from previous H/P or p:  Changes





Objective


Vitals





Vital Signs








  Date Time  Temp Pulse Resp B/P (MAP) Pulse Ox O2 Delivery O2 Flow Rate FiO2


 


11/30/24 11:47  95      


 


11/30/24 11:47   16  95 Hi-Flow Heated NC+ 60 50





        50


 


11/30/24 11:45    138/85 (102)    


 


11/30/24 08:00 99.2       





 99.2       








Intake/Output











                               Intake and Output 


 


 11/30/24





 07:00


 


Intake Total 1153 ml


 


Output Total 200 ml


 


Balance 953 ml


 


 


 


Intake Oral 600 ml


 


IV Total 553 ml


 


Output Urine Total 200 ml


 


# Voids 3








General Appearance:  Alert, Oriented X3, Cooperative, moderate distress


HEENT:  Atraumatic


Lungs:  Other (Decreased air entry bilateral)


Cardiovascular:  Normal S1, Normal S2, Other (Tachycardia)


Abdomen:  Normal bowel sounds, Soft, No tenderness


Neuro:  Normal speech, Cranial nerves 3-12 NL


Psych/Mental Status:  Mental status NL, Mood NL


Medications





                               Current Medications








 Medications  Dose


 Ordered  Sig/Brian


 Route  Start Time


 Stop Time Status Last Admin


Dose Admin


 


 Dextrose  50 ml  UD  PRN


 IV  11/26/24 18:15


     





 


 Aspirin  81 mg  DAILY


 PO  11/27/24 10:00


    11/30/24 08:10


81 MG


 


 Atorvastatin


 Calcium  40 mg  HS


 PO  11/26/24 22:00


    11/29/24 21:52


40 MG


 


 Morphine Sulfate  2 mg  Q30MP  PRN


 IV  11/26/24 18:15


     





 


 Acetaminophen  650 mg  Q6HP  PRN


 PO  11/26/24 18:15


    11/29/24 12:42


650 MG


 


 Docusate Sodium  100 mg  DAILY


 PO  11/27/24 10:00


    11/30/24 08:10


100 MG


 


 Ondansetron HCl  4 mg  Q4HP  PRN


 IV  11/26/24 18:15


     





 


 Nitroglycerin  0.4 mg  Q5MINP  PRN


 SL  11/26/24 18:15


     





 


 Albuterol  2.5 mg  Q4HR


 NEB  11/26/24 22:00


    11/30/24 09:48


2.5 MG


 


 Methylprednisolone


 Sodium Succinate  40 mg  BID


 IV  11/26/24 22:00


    11/30/24 08:10


40 MG


 


 Ceftriaxone Sodium  50 ml @ 


 100 mls/hr  DAILY@09


 IV  11/27/24 09:00


    11/30/24 08:10


100 MLS/HR


 


 Azithromycin  250 ml @ 


 125 mls/hr  DAILY


 IV  11/27/24 10:00


    11/30/24 08:11


125 MLS/HR


 


 Furosemide  20 mg  DAILY


 IV  11/29/24 10:00


    11/30/24 08:11


20 MG


 


 Heparin Sodium/


 Dextrose  250 ml @ 


 11 mls/hr  [id number]


 IV  11/29/24 08:30


    11/30/24 03:53


11 MLS/HR


 


 Diagnostic Test


  (Pha)  1 strip  ACHS


 VI  11/29/24 17:00


    11/30/24 11:44


1 STRIP


 


 Melatonin  5 mg  HS  PRN


 PO  11/29/24 22:00


    11/30/24 00:05


5 MG


 


 Insulin Human


 Regular    ACHS


 SC  11/29/24 22:00


    11/30/24 11:45


2 UNITS











Laboratory Results


Laboratory Tests


11/30/24 04:44











Chemistry








Test


 11/30/24


04:44


 


Albumin


 3.8 g/dL


(3.2-4.8)


 


Calcium Level


 10.0 mg/dL


(8.7-10.4)


 


Magnesium Level


 1.8 mg/dL


(1.6-2.6)


 


Total Protein


 6.0 g/dL


(5.7-8.2)








Coagulation








Test


 11/29/24


19:03 11/30/24


00:49


 


Prothrombin Time


 11.4 sec


(9.3-11.8) 11.4 sec


(9.3-11.8)


 


Prothrombin Time INR


 1.08


(0.9-1.15) 1.08


(0.9-1.15)


 


Activated Partial


Thromboplast Time 51.9 SEC


(24.5-34.5)  H 50.7 SEC


(24.5-34.5)  H








LFT








Test


 11/30/24


04:44


 


Alanine Aminotransferase (ALT) 17 U/L (7-40)  


 


Alkaline Phosphatase


 65 U/L


()


 


Aspartate Amino Transferase


(AST) 15 U/L (13-40)





 


Total Bilirubin


 0.6 mg/dL


(0.2-1.0)








Microbiology





                                  Microbiology








 Date/Time


Source Procedure


Growth Status





 


 11/28/24 15:06


Nose MRSA Screen - Final


 Complete








Labs and/or images reviewed:  Labs reviewed by me, Image(s) reviewed by me





Assessment/Plan


Assessment/Plan


A 64-year-old female patient; with multiple comorbidities; who presented to 

emergency department with SOB and chest tightness.








#Acute hypoxic respiratory failure due to bilateral pulmonary emboli; reviewed 

the available imaging studies and ABGs; continue oxygen therapy as needed; now 

on high-flow nasal cannula FiO2 of 50%; was on BiPAP; continue close monitoring


#Submassive bilateral pulmonary emboli with RV strain in the setting of history 

of pulmonary embolism 2009; causing SOB and chest tightness; stopped Xarelto 

last month; continue heparin infusion; cardiology is following; reviewed chest 

angiogram and echocardiogram; no DVTs; continue close monitoring


#Chest tightness with NSTEMI type 2; demand ischemia; the setting of RV strain 

secondary to submassive bilateral pulmonary emboli; telemetry; continue aspirin 

and statin; cardiology is following; continue close monitoring


#Essential hypertension; continue antihypertensive medications as indicated; 

continue monitoring


#Hyperglycemia; prediabetic; continue insulin sliding scale and hypoglycemia 

protocol; continue monitoring


#Asthma exacerbation; continue IV steroids with IV antibiotics; continue oxygen 

therapy as above; continue monitoring


#Metabolic syndrome with prediabetes and dyslipidemia along with 

overweight/obesity; counseled the patient on the importance of adopting healthy 

lifestyle with diet and exercise in order to lose weight


#Dyslipidemia; continue statin; reviewed lipid profile; continue monitoring


#Prediabetes; hemoglobin A1c of 6.1%; management as above; continue monitoring


#GOPAL; most likely vasomotor nephropathy in the setting of submassive bilateral 

pulmonary emboli and RV strain; avoid nephrotoxic agents; continue monitoring


#Macrocytosis without anemia; to investigate as outpatient; continue monitoring


#Low TSH; free T4 pending; continue monitoring








45 minutes of critical care time








This medical document was created using an electronic medical record system with

computerized dictation system.  Although this document has been carefully 

reviewed, there might still be some phonetic and typographical errors.  These 

areas are purely typographical due to imperfections of the software programs, 

and do not reflect any compromise in the patient's medical care.


Plan discussed with:  Patient, Other (Nurse)


My Orders





                           Orders - ROBERTO RODNEY MD








Procedure Category Date Status





  Time 


 


PTPTT LAB 12/1/24 Verified





  04:00 


 


Complete Blood Count LAB 12/1/24 Verified





  04:00 


 


Heparin Per Pharmacy DAO 11/30/24 In Process





Protocol  08:36 


 


Basic Metabolic Panel LAB 12/1/24 Verified





  04:00 


 


Complete Blood Count LAB 12/2/24 Verified





  05:00 


 


Complete Blood Count LAB 12/3/24 Verified





  05:00 


 


Complete Blood Count LAB 12/4/24 Verified





  05:00 


 


Complete Blood Count LAB 12/5/24 Verified





  05:00 











Date of Service:  Nov 30, 2024


Billing Provider:  ROBERTO RODNEY MD


Common Visit Codes:  22584-[id number] CARE 30-74 MIN (45 minutes)











ROBERTO RODNEY MD             Nov 30, 2024 13:20

## 2024-11-30 NOTE — DVHPN2
Consult Progress Note


Date Seen:  Nov 30, 2024


Subjective


Review of Systems:  CVS:Normal, RESPIRATORY:Normal, NEURO:Normal





Objective


vital signs





                                   Vital Sign








  Date Time  Temp Pulse Resp B/P (MAP) Pulse Ox O2 Delivery O2 Flow Rate FiO2


 


11/30/24 08:11    134/84    


 


11/30/24 08:00   12  95 Hi-Flow Heated NC+ 60 50





        50


 


11/30/24 08:00  95      


 


11/30/24 04:00 98.4       





 98.4       














                           Total Intake and Output   


 


 11/29/24 11/29/24 11/30/24





 15:00 23:00 07:00


 


Intake Total 388 ml 577 ml 188 ml


 


Output Total   200 ml


 


Balance 388 ml 577 ml -12 ml








medications





                               Current Medications








 Medications  Dose


 Ordered  Sig/Brian


 Route  Start Time


 Stop Time Status Last Admin


Dose Admin


 


 Dextrose  50 ml  UD  PRN


 IV  11/26/24 18:15


     





 


 Aspirin  81 mg  DAILY


 PO  11/27/24 10:00


    11/30/24 08:10


81 MG


 


 Atorvastatin


 Calcium  40 mg  HS


 PO  11/26/24 22:00


    11/29/24 21:52


40 MG


 


 Morphine Sulfate  2 mg  Q30MP  PRN


 IV  11/26/24 18:15


     





 


 Acetaminophen  650 mg  Q6HP  PRN


 PO  11/26/24 18:15


    11/29/24 12:42


650 MG


 


 Docusate Sodium  100 mg  DAILY


 PO  11/27/24 10:00


    11/30/24 08:10


100 MG


 


 Ondansetron HCl  4 mg  Q4HP  PRN


 IV  11/26/24 18:15


     





 


 Nitroglycerin  0.4 mg  Q5MINP  PRN


 SL  11/26/24 18:15


     





 


 Albuterol  2.5 mg  Q4HR


 NEB  11/26/24 22:00


    11/30/24 06:12


2.5 MG


 


 Methylprednisolone


 Sodium Succinate  40 mg  BID


 IV  11/26/24 22:00


    11/30/24 08:10


40 MG


 


 Ceftriaxone Sodium  50 ml @ 


 100 mls/hr  DAILY@09


 IV  11/27/24 09:00


    11/30/24 08:10


100 MLS/HR


 


 Azithromycin  250 ml @ 


 125 mls/hr  DAILY


 IV  11/27/24 10:00


    11/30/24 08:11


125 MLS/HR


 


 Furosemide  20 mg  DAILY


 IV  11/29/24 10:00


    11/30/24 08:11


20 MG


 


 Heparin Sodium/


 Dextrose  250 ml @ 


 11 mls/hr  [id number]


 IV  11/29/24 08:30


    11/30/24 03:53


11 MLS/HR


 


 Diagnostic Test


  (Pha)  1 strip  ACHS


 VI  11/29/24 17:00


    11/30/24 06:35


1 STRIP


 


 Melatonin  5 mg  HS  PRN


 PO  11/29/24 22:00


    11/30/24 00:05


5 MG


 


 Insulin Human


 Regular    ACHS


 SC  11/29/24 22:00


    11/30/24 06:44


2 UNITS








Examination:  LUNGS:Abnormal (Diminished.  High-flow O2 at 50L at 60%), 

CVS:Normal (NSR), NEURO:Normal


laboratory and microbiology


                                Laboratory Tests


11/30/24 04:44

















Test


 11/30/24


04:44 Range/Units


 


 


Serum Glucose 142 H   mg/dL











Problem List/Assessment/Plan


Problem List/Assessment/Plan


NSTEMI with bilateral pulmonary emboli with evidence of RV strain, S1T3 pattern


Acute on chronic hypoxic respiratory failure


Left lung pneumonia


Hx of PE in 2009, off Xarelto x 1 mo.


Asthma exacerbation


Acute kidney injury


Pre-diabetes, newly diagnosed


Suboptimal medical therapy





Plan/Recommendation


(Dr. Sanchez)





Transthoracic echocardiogram revealed EF 55%  with a moderately dilated right 

ventricle, moderate/severe reduced right ventricular systolic function, and RVSP

at 65 mmHg.  The patient with a history of PE stopped Xarelto therapy 

approximately a month ago.  She now presents with bilateral PE R>L with evidence

of heart strain as well as PNA.  Continues of high-flow O2 currently on 50L at 

60% with O2 Saturations at 92-94%.   Continue heparin drip per pharmacy 

protocol.  ABX therapy per primary care team.  Following daily to re-evaluate 

for the need for possible thrombectomy.  Thank you for allowing us to 

participate in this patient's care.  Please call if you have any questions or 

concerns.





Critical care time:  30 min.  This medical document was created using an 

electronic medical record system with voice recognition software and 

computerized dictation system.  Although this document has been carefully 

reviewed, there might still be some phonetic and typographical errors.  

Occasional wrong-word or ``sound-alike substitutions may have occurred due to 

the inherent limitations of voice recognition software.  These areas are purely 

typographical due to imperfections of the software programs and do not reflect 

any compromise in the patient's medical care.  Please read the chart carefully 

and recognize, using context, where these substitutions have occurred.


Plan discussed with:  Patient, Other





Date of Service:  Nov 30, 2024


Billing Provider:  YO SANCHEZ MD


Cardiology Common Codes:  91861-[id number] CARE 30-74 MIN











DAJUAN FARRAR [facility]           Nov 30, 2024 08:46

## 2024-12-01 VITALS
RESPIRATION RATE: 13 BRPM | HEART RATE: 83 BPM | DIASTOLIC BLOOD PRESSURE: 82 MMHG | OXYGEN SATURATION: 96 % | SYSTOLIC BLOOD PRESSURE: 126 MMHG

## 2024-12-01 VITALS
DIASTOLIC BLOOD PRESSURE: 79 MMHG | HEART RATE: 88 BPM | RESPIRATION RATE: 17 BRPM | SYSTOLIC BLOOD PRESSURE: 138 MMHG | OXYGEN SATURATION: 94 %

## 2024-12-01 VITALS
HEART RATE: 83 BPM | OXYGEN SATURATION: 96 % | DIASTOLIC BLOOD PRESSURE: 68 MMHG | SYSTOLIC BLOOD PRESSURE: 109 MMHG | RESPIRATION RATE: 18 BRPM

## 2024-12-01 VITALS
SYSTOLIC BLOOD PRESSURE: 109 MMHG | OXYGEN SATURATION: 96 % | RESPIRATION RATE: 13 BRPM | HEART RATE: 75 BPM | DIASTOLIC BLOOD PRESSURE: 68 MMHG

## 2024-12-01 VITALS
SYSTOLIC BLOOD PRESSURE: 140 MMHG | OXYGEN SATURATION: 92 % | DIASTOLIC BLOOD PRESSURE: 91 MMHG | RESPIRATION RATE: 19 BRPM | HEART RATE: 103 BPM

## 2024-12-01 VITALS
RESPIRATION RATE: 14 BRPM | OXYGEN SATURATION: 95 % | DIASTOLIC BLOOD PRESSURE: 82 MMHG | HEART RATE: 85 BPM | SYSTOLIC BLOOD PRESSURE: 129 MMHG

## 2024-12-01 VITALS
OXYGEN SATURATION: 94 % | RESPIRATION RATE: 17 BRPM | SYSTOLIC BLOOD PRESSURE: 130 MMHG | DIASTOLIC BLOOD PRESSURE: 79 MMHG | HEART RATE: 88 BPM

## 2024-12-01 VITALS
DIASTOLIC BLOOD PRESSURE: 82 MMHG | HEART RATE: 89 BPM | RESPIRATION RATE: 17 BRPM | SYSTOLIC BLOOD PRESSURE: 131 MMHG | OXYGEN SATURATION: 95 %

## 2024-12-01 VITALS
DIASTOLIC BLOOD PRESSURE: 82 MMHG | RESPIRATION RATE: 14 BRPM | HEART RATE: 87 BPM | OXYGEN SATURATION: 96 % | SYSTOLIC BLOOD PRESSURE: 131 MMHG

## 2024-12-01 VITALS
HEART RATE: 102 BPM | SYSTOLIC BLOOD PRESSURE: 111 MMHG | OXYGEN SATURATION: 94 % | DIASTOLIC BLOOD PRESSURE: 52 MMHG | RESPIRATION RATE: 13 BRPM

## 2024-12-01 VITALS
OXYGEN SATURATION: 95 % | DIASTOLIC BLOOD PRESSURE: 75 MMHG | HEART RATE: 85 BPM | RESPIRATION RATE: 17 BRPM | SYSTOLIC BLOOD PRESSURE: 139 MMHG

## 2024-12-01 VITALS — HEART RATE: 91 BPM | RESPIRATION RATE: 14 BRPM | OXYGEN SATURATION: 96 %

## 2024-12-01 VITALS
DIASTOLIC BLOOD PRESSURE: 85 MMHG | RESPIRATION RATE: 14 BRPM | HEART RATE: 98 BPM | SYSTOLIC BLOOD PRESSURE: 131 MMHG | OXYGEN SATURATION: 95 %

## 2024-12-01 VITALS
OXYGEN SATURATION: 94 % | SYSTOLIC BLOOD PRESSURE: 137 MMHG | RESPIRATION RATE: 12 BRPM | HEART RATE: 89 BPM | DIASTOLIC BLOOD PRESSURE: 82 MMHG

## 2024-12-01 VITALS
HEART RATE: 88 BPM | RESPIRATION RATE: 14 BRPM | SYSTOLIC BLOOD PRESSURE: 132 MMHG | OXYGEN SATURATION: 95 % | DIASTOLIC BLOOD PRESSURE: 76 MMHG

## 2024-12-01 VITALS
SYSTOLIC BLOOD PRESSURE: 127 MMHG | DIASTOLIC BLOOD PRESSURE: 84 MMHG | RESPIRATION RATE: 22 BRPM | HEART RATE: 83 BPM | OXYGEN SATURATION: 94 %

## 2024-12-01 VITALS
RESPIRATION RATE: 14 BRPM | SYSTOLIC BLOOD PRESSURE: 131 MMHG | OXYGEN SATURATION: 96 % | TEMPERATURE: 98.1 F | HEART RATE: 96 BPM | DIASTOLIC BLOOD PRESSURE: 81 MMHG

## 2024-12-01 VITALS
TEMPERATURE: 98.8 F | SYSTOLIC BLOOD PRESSURE: 135 MMHG | OXYGEN SATURATION: 87 % | RESPIRATION RATE: 15 BRPM | HEART RATE: 86 BPM | DIASTOLIC BLOOD PRESSURE: 83 MMHG

## 2024-12-01 VITALS
OXYGEN SATURATION: 95 % | DIASTOLIC BLOOD PRESSURE: 82 MMHG | SYSTOLIC BLOOD PRESSURE: 129 MMHG | HEART RATE: 82 BPM | RESPIRATION RATE: 14 BRPM

## 2024-12-01 VITALS — OXYGEN SATURATION: 94 % | RESPIRATION RATE: 13 BRPM | HEART RATE: 84 BPM

## 2024-12-01 VITALS
HEART RATE: 93 BPM | OXYGEN SATURATION: 95 % | TEMPERATURE: 98 F | SYSTOLIC BLOOD PRESSURE: 129 MMHG | DIASTOLIC BLOOD PRESSURE: 77 MMHG | RESPIRATION RATE: 14 BRPM

## 2024-12-01 VITALS
DIASTOLIC BLOOD PRESSURE: 67 MMHG | HEART RATE: 93 BPM | OXYGEN SATURATION: 96 % | RESPIRATION RATE: 16 BRPM | SYSTOLIC BLOOD PRESSURE: 115 MMHG

## 2024-12-01 VITALS
HEART RATE: 82 BPM | DIASTOLIC BLOOD PRESSURE: 75 MMHG | OXYGEN SATURATION: 97 % | RESPIRATION RATE: 14 BRPM | SYSTOLIC BLOOD PRESSURE: 139 MMHG

## 2024-12-01 VITALS
HEART RATE: 84 BPM | OXYGEN SATURATION: 96 % | SYSTOLIC BLOOD PRESSURE: 127 MMHG | DIASTOLIC BLOOD PRESSURE: 84 MMHG | RESPIRATION RATE: 11 BRPM

## 2024-12-01 VITALS
DIASTOLIC BLOOD PRESSURE: 80 MMHG | RESPIRATION RATE: 16 BRPM | SYSTOLIC BLOOD PRESSURE: 129 MMHG | OXYGEN SATURATION: 95 % | HEART RATE: 77 BPM

## 2024-12-01 VITALS
HEART RATE: 85 BPM | DIASTOLIC BLOOD PRESSURE: 91 MMHG | SYSTOLIC BLOOD PRESSURE: 140 MMHG | RESPIRATION RATE: 14 BRPM | OXYGEN SATURATION: 92 %

## 2024-12-01 VITALS
DIASTOLIC BLOOD PRESSURE: 83 MMHG | RESPIRATION RATE: 13 BRPM | HEART RATE: 85 BPM | TEMPERATURE: 97.9 F | OXYGEN SATURATION: 95 % | SYSTOLIC BLOOD PRESSURE: 132 MMHG

## 2024-12-01 VITALS
HEART RATE: 89 BPM | OXYGEN SATURATION: 96 % | SYSTOLIC BLOOD PRESSURE: 138 MMHG | RESPIRATION RATE: 12 BRPM | DIASTOLIC BLOOD PRESSURE: 79 MMHG

## 2024-12-01 VITALS — OXYGEN SATURATION: 95 % | RESPIRATION RATE: 15 BRPM | HEART RATE: 84 BPM | TEMPERATURE: 97.8 F

## 2024-12-01 VITALS — RESPIRATION RATE: 14 BRPM | HEART RATE: 91 BPM | OXYGEN SATURATION: 95 %

## 2024-12-01 LAB
ANION GAP SERPL CALCULATED.3IONS-SCNC: 7 MMOL/L (ref 5–15)
APTT PPP: 56.4 SEC (ref 24.5–34.5)
BASE EXCESS BLDV CALC-SCNC: 5.5 MMOL/L (ref -2–3)
BUN SERPL-MCNC: 29 MG/DL (ref 9–23)
BUN/CREAT SERPL: 29.3 (ref 10–20)
CALCIUM SERPL-MCNC: 9.8 MG/DL (ref 8.7–10.4)
CHLORIDE SERPL-SCNC: 101 MMOL/L (ref 98–107)
CO2 SERPL-SCNC: 33 MMOL/L (ref 20–31)
GLUCOSE SERPL-MCNC: 148 MG/DL (ref 74–106)
HCT VFR BLD AUTO: 46.7 % (ref 36–46)
HGB BLD-MCNC: 15.4 G/DL (ref 12.2–16.2)
INR PPP: 1.09 (ref 0.9–1.15)
MCH RBC QN AUTO: 33.3 PG (ref 28–32)
MCV RBC AUTO: 101.1 FL (ref 80–100)
NRBC BLD QL AUTO: 0.1 %
POTASSIUM SERPL-SCNC: 4.6 MMOL/L (ref 3.5–5.1)
PROTHROMBIN TIME: 11.5 SEC (ref 9.3–11.8)
SODIUM SERPL-SCNC: 141 MMOL/L (ref 136–145)

## 2024-12-01 NOTE — DVHPN2
Subjective


SOB and chest tightness; resolving


Reviewed:  Care Plan, H&P, Labs, Medications, Previous Orders, Radiology, Other 

(Consultation)


Changes from previous H/P or p:  Changes





Objective


Vitals





Vital Signs








  Date Time  Temp Pulse Resp B/P (MAP) Pulse Ox O2 Delivery O2 Flow Rate FiO2


 


12/1/24 18:00   14  97 Hi-Flow Heated NC+ 60 50





        50


 


12/1/24 16:00  87      


 


12/1/24 15:00    115/67 (83)    


 


12/1/24 12:00 98.8       





 98.8       








Intake/Output











                               Intake and Output 


 


 12/1/24





 07:00


 


Intake Total 1464 ml


 


Output Total 1281 ml


 


Balance 183 ml


 


 


 


Intake Oral 850 ml


 


IV Total 614 ml


 


Output Urine Total 1275 ml


 


Stool Total 6 ml








General Appearance:  Alert, Oriented X3, Cooperative, moderate distress


HEENT:  Atraumatic


Lungs:  Other (Decreased air entry bilateral)


Cardiovascular:  Regular rate, Normal S1, Normal S2


Abdomen:  Normal bowel sounds, Soft, No tenderness


Neuro:  Normal speech, Cranial nerves 3-12 NL


Psych/Mental Status:  Mental status NL, Mood NL


Medications





                               Current Medications








 Medications  Dose


 Ordered  Sig/Brian


 Route  Start Time


 Stop Time Status Last Admin


Dose Admin


 


 Dextrose  50 ml  UD  PRN


 IV  11/26/24 18:15


     





 


 Aspirin  81 mg  DAILY


 PO  11/27/24 10:00


    12/1/24 09:33


81 MG


 


 Atorvastatin


 Calcium  40 mg  HS


 PO  11/26/24 22:00


    11/30/24 21:06


40 MG


 


 Morphine Sulfate  2 mg  Q30MP  PRN


 IV  11/26/24 18:15


     





 


 Acetaminophen  650 mg  Q6HP  PRN


 PO  11/26/24 18:15


    11/30/24 17:22


650 MG


 


 Docusate Sodium  100 mg  DAILY


 PO  11/27/24 10:00


    12/1/24 09:37


100 MG


 


 Ondansetron HCl  4 mg  Q4HP  PRN


 IV  11/26/24 18:15


     





 


 Nitroglycerin  0.4 mg  Q5MINP  PRN


 SL  11/26/24 18:15


     





 


 Albuterol  2.5 mg  Q4HR


 NEB  11/26/24 22:00


    12/1/24 18:19


2.5 MG


 


 Methylprednisolone


 Sodium Succinate  40 mg  BID


 IV  11/26/24 22:00


    12/1/24 09:32


40 MG


 


 Ceftriaxone Sodium  50 ml @ 


 100 mls/hr  DAILY@09


 IV  11/27/24 09:00


    12/1/24 08:33


100 MLS/HR


 


 Azithromycin  250 ml @ 


 125 mls/hr  DAILY


 IV  11/27/24 10:00


    12/1/24 09:32


125 MLS/HR


 


 Furosemide  20 mg  DAILY


 IV  11/29/24 10:00


    12/1/24 09:32


20 MG


 


 Heparin Sodium/


 Dextrose  250 ml @ 


 11 mls/hr  [id number]


 IV  11/29/24 08:30


    12/1/24 00:13


11 MLS/HR


 


 Diagnostic Test


  (Pha)  1 strip  ACHS


 VI  11/29/24 17:00


    12/1/24 18:16


1 STRIP


 


 Melatonin  5 mg  HS  PRN


 PO  11/29/24 22:00


    11/30/24 00:05


5 MG


 


 Insulin Human


 Regular    ACHS


 SC  11/29/24 22:00


    12/1/24 18:21


2 UNITS











Laboratory Results


Laboratory Tests


12/1/24 04:20











Chemistry








Test


 12/1/24


04:20


 


Calcium Level


 9.8 mg/dL


(8.7-10.4)








Coagulation








Test


 12/1/24


04:20


 


Prothrombin Time


 11.5 sec


(9.3-11.8)


 


Prothrombin Time INR


 1.09


(0.9-1.15)


 


Activated Partial


Thromboplast Time 56.4 SEC


(24.5-34.5)  H








Blood Gas Results








Test


 12/1/24


05:59


 


Arterial Blood pH


 7.417


(7.350-7.450)


 


FiO2 % 50.0  








Microbiology





                                  Microbiology








 Date/Time


Source Procedure


Growth Status





 


 11/30/24 00:00


Nose MRSA Screen - Final


 Complete








Labs and/or images reviewed:  Labs reviewed by me, Image(s) reviewed by me





Assessment/Plan


Assessment/Plan


A 64-year-old female patient; with multiple comorbidities; who presented to 

emergency department with SOB and chest tightness.








#Acute hypoxic respiratory failure due to bilateral pulmonary emboli; reviewed 

available imaging studies and ABGs; continue oxygen therapy as needed; now on 

high-flow nasal cannula FiO2 of 40%; was on BiPAP; continue close monitoring


#Submassive bilateral pulmonary emboli with RV strain in the setting of history 

of pulmonary embolism 2009; causing SOB and chest tightness; stopped Xarelto in 

October 2024; continue heparin infusion; cardiology is following; reviewed chest

angiogram and echocardiogram; no DVTs; continue close monitoring


#Chest tightness with NSTEMI type 2; demand ischemia; the setting of RV strain 

secondary to submassive bilateral pulmonary emboli; telemetry; continue aspirin 

and statin; cardiology is following; continue close monitoring


#Essential hypertension; continue antihypertensive medications as indicated; 

continue monitoring


#Hyperglycemia; prediabetic; continue insulin sliding scale and hypoglycemia 

protocol; continue monitoring


#Asthma exacerbation; continue IV steroids with IV antibiotics; continue oxygen 

therapy as above; continue monitoring


#Metabolic syndrome with prediabetes and dyslipidemia along with 

overweight/obesity; counseled the patient on the importance of adopting healthy 

lifestyle with diet and exercise in order to lose weight


#Dyslipidemia; continue statin; reviewed lipid profile; continue monitoring


#Prediabetes; newly diagnosed; hemoglobin A1c of 6.1%; management as above; 

continue monitoring


#GOPAL; most likely vasomotor nephropathy in the setting of submassive bilateral 

pulmonary emboli and RV strain; avoid nephrotoxic agents; continue monitoring


#Macrocytosis without anemia; to investigate as outpatient; continue monitoring


#Low TSH; free T4 pending; continue monitoring








48 minutes of critical care time








This medical document was created using an electronic medical record system with

computerized dictation system.  Although this document has been carefully 

reviewed, there might still be some phonetic and typographical errors.  These 

areas are purely typographical due to imperfections of the software programs, 

and do not reflect any compromise in the patient's medical care.


Plan discussed with:  Patient, Other (Nurse)


My Orders





                           Orders - ROBERTO RODNEY MD








Procedure Category Date Status





  Time 


 


PTPTT LAB 12/2/24 Verified





  05:00 


 


Comprehensive LAB 12/2/24 Verified





Metabolic Panel  04:00 


 


Magnesium LAB 12/2/24 Verified





  04:00 


 


1 View Decubitus XY 12/2/24 Logged





Chest Xray  07:00 


 


Abg W/ Co-Ox RT 12/2/24 Logged





  06:00 











Date of Service:  Dec 1, 2024


Billing Provider:  ROBERTO RODNEY MD


Common Visit Codes:  56641-QIEYOSQI CARE 30-74 MIN (48 minutes)











ROBERTO RODNEY MD              Dec 1, 2024 18:59

## 2024-12-01 NOTE — DVHPN2
Progress Note - Dictate


Date Seen:  Dec 1, 2024


Medical Necessity Reason


Pt with a Central, PICC or Fol:  Yes


The following are medically ne:  Mi Catheter


Reason for mi catheter:  Strict I&O


Subjective


Patient seen and examined at bedside.


Remains on supplemental oxygen


Overnight events reviewed.


vital signs





                                   Vital Sign








  Date Time  Temp Pulse Resp B/P (MAP) Pulse Ox O2 Delivery O2 Flow Rate FiO2


 


12/1/24 22:15  75 13  96  60.0 50


 


12/1/24 22:00      Hi-Flow Heated NC+  


 


12/1/24 21:00    131/82 (98)    


 


12/1/24 20:00 98.1       





 98.1       














                           Total Intake and Output   


 


 11/30/24 11/30/24 12/1/24





 15:00 23:00 07:00


 


Intake Total 438 ml 377 ml 649 ml


 


Output Total  804 ml 477 ml


 


Balance 438 ml -427 ml 172 ml








medications





                               Current Medications








 Medications  Dose


 Ordered  Sig/Brian


 Route  Start Time


 Stop Time Status Last Admin


Dose Admin


 


 Dextrose  50 ml  UD  PRN


 IV  11/26/24 18:15


     





 


 Aspirin  81 mg  DAILY


 PO  11/27/24 10:00


    12/1/24 09:33


81 MG


 


 Atorvastatin


 Calcium  40 mg  HS


 PO  11/26/24 22:00


    12/1/24 22:04


40 MG


 


 Morphine Sulfate  2 mg  Q30MP  PRN


 IV  11/26/24 18:15


     





 


 Acetaminophen  650 mg  Q6HP  PRN


 PO  11/26/24 18:15


    11/30/24 17:22


650 MG


 


 Docusate Sodium  100 mg  DAILY


 PO  11/27/24 10:00


    12/1/24 09:37


100 MG


 


 Ondansetron HCl  4 mg  Q4HP  PRN


 IV  11/26/24 18:15


     





 


 Nitroglycerin  0.4 mg  Q5MINP  PRN


 SL  11/26/24 18:15


     





 


 Albuterol  2.5 mg  Q4HR


 NEB  11/26/24 22:00


    12/1/24 22:15


2.5 MG


 


 Methylprednisolone


 Sodium Succinate  40 mg  BID


 IV  11/26/24 22:00


    12/1/24 22:04


40 MG


 


 Ceftriaxone Sodium  50 ml @ 


 100 mls/hr  DAILY@09


 IV  11/27/24 09:00


    12/1/24 08:33


100 MLS/HR


 


 Azithromycin  250 ml @ 


 125 mls/hr  DAILY


 IV  11/27/24 10:00


    12/1/24 09:32


125 MLS/HR


 


 Furosemide  20 mg  DAILY


 IV  11/29/24 10:00


    12/1/24 09:32


20 MG


 


 Heparin Sodium/


 Dextrose  250 ml @ 


 11 mls/hr  [id number]


 IV  11/29/24 08:30


    12/1/24 00:13


11 MLS/HR


 


 Diagnostic Test


  (Pha)  1 strip  ACHS


 VI  11/29/24 17:00


    12/1/24 22:04


1 STRIP


 


 Melatonin  5 mg  HS  PRN


 PO  11/29/24 22:00


    12/1/24 20:22


5 MG


 


 Insulin Human


 Regular    ACHS


 SC  11/29/24 22:00


    12/1/24 18:21


2 UNITS








objective


Gen.: Patient lying in bed in no apparent distress. On supplemental oxygen.


Head: Normocephalic, atraumatic.


Eyes: EOMI/PERRLA.


Ears: Normal hearing. Normal anatomy.


Neck/trachea: Trachea midline, supple.


Nose: Normal external anatomy.


Mouth: Moist mucous membranes.


Chest: Decreased air entry bilaterally. Wheezing present. No rhonchi.


Cardiovascular: Positive S1, positive S2. Regular rate and rhythm.


Abdomen: Positive bowel sounds in all 4 quadrants. Soft, non-tender, non-

distended.


: Deferred.


Rectal: Deferred.


Skin: Warm, dry. Intact.


Extremities: 2+ radial pulses bilaterally. No lower extremity edema.


Neuro: Awake, alert, oriented x3. No gross motor or sensory deficits. Cranial 

nerves II through XII intact. Gait not assessed.


laboratory and microbiology


                                Laboratory Tests


12/1/24 04:20

















Test


 12/1/24


04:20 Range/Units


 


 


Serum Glucose 148 H   mg/dL








Assessment/Plan


Impression:


Acute hypoxic respiratory failure


Pulmonary emboli w/ RV strain


Asthma exacerbation


Atelectasis.


Hx of nicotine dependence





Events:


Remains on supplemental oxygen


On high flow O2 at flow rate 60 LPM , FiO2 50%.


(Increased o2 requirements, up from 40%)


Taper O2 as tolerated





Heparin drip.


PTT therapeutic.





Wheezing slowly improving





Continue bronchodilators


Continue IV steroids


Continue antibiotics


Incentive spirometry





Awaiting IR eval for thrombectomy.





Labs and imaging reviewed.


Rest of plan as noted below.





Plan:


Supplemental oxygen


Titrate to keep O2 sats above 92%.





Continue bronchodilators.


Continue antibiotics


IV steroids





Heparin drip.





Monitor renal function.


Monitor electrolytes. Supplement as necessary.


Monitor ins and outs.





DVT prophylaxis.





Prognosis: Poor given patient's multiple co-morbidities.


Condition: Critical





Rest of plan per hospitalist and other consultants.





A total of 35 minutes of critical care time was spent reviewing the patient 

record, examining the patient, making a diagnostic and therapeutic plan, 

discussing this plan with the medical personnel, following up on diagnostic 

studies and following the patient for clinical stability excluding any and all 

procedures.  At least 50% of this time was spent in direct, face-to-face 

contact.





Thank you Dr. Jeff Alston MD, for allowing me to participate in this 

patient's care.


Further recommendations will depend on the patient's clinical course.


Please do not hesitate to contact me if you have any questions or concerns.





This medical document was created using an electronic medical record system with

Dragon computerized dictation system. Although these documentations are being 

carefully reviewed, there may still be some phonetic and typographical changes. 

The errors are purely typographical, due to imperfection on the software 

program, and do not reflect any compromise in the patient's medical care.


Plan discussed with:  Other (RN Beverley)


Critical Care Time(min):  35











MIYA KHALIL MD              Dec 1, 2024 23:43

## 2024-12-01 NOTE — DVH
CLINICAL INFORMATION:  64 years old, Female; Follow up on acute respiratory failure.  



TECHNIQUE:  Single AP portable chest radiograph was obtained.



COMPARISON: XY CHEST PORTABLE on DOS: 11/30/24, XY CHEST PORTABLE on DOS: 11/28/24, XY CHEST PORTABLE
 on DOS: 11/26/24



FINDINGS:



Change cardiomegaly and prominence of the pulmonary vasculature, similar to the prior exam.  No focal
 consolidation. No other significant interval change.



IMPRESSION: 



1. Cardiomegaly and prominence of the pulmonary vasculature May suggest a degree of pulmonary vascula
r congestion in the appropriate clinical setting.



2. No focal consolidation. 



 



Electronically Signed by: Hector Kincaid at 12/01/2024 07:57:28 AM

## 2024-12-01 NOTE — MEDREC
[facility] ASP Intervention


Section I


[facility] ASP Intervention:  Review courses of therapy (PLEASE CONSIDER D/C 

ANTIBIOTIC(S) IN ABSENCE OF BACTERIAL INFECTION)











MAZIN NUNEZ PHARMACIST        Dec 1, 2024 11:03

## 2024-12-01 NOTE — DVHPN2
Consult Progress Note


Date Seen:  Dec 1, 2024


Subjective


Patient reports:  Feels better


Review of Systems:  CVS:Normal, RESPIRATORY:Normal, NEURO:Normal





Objective


vital signs





                                   Vital Sign








  Date Time  Temp Pulse Resp B/P (MAP) Pulse Ox O2 Delivery O2 Flow Rate FiO2


 


12/1/24 09:56  88 17  94  50.0 45


 


12/1/24 09:32    138/79    


 


12/1/24 08:00      Hi-Flow Heated NC+  


 


12/1/24 04:00 97.8       





 97.8       














                           Total Intake and Output   


 


 11/30/24 11/30/24 12/1/24





 15:00 23:00 07:00


 


Intake Total 438 ml 377 ml 649 ml


 


Output Total  804 ml 477 ml


 


Balance 438 ml -427 ml 172 ml








medications





                               Current Medications








 Medications  Dose


 Ordered  Sig/Brian


 Route  Start Time


 Stop Time Status Last Admin


Dose Admin


 


 Dextrose  50 ml  UD  PRN


 IV  11/26/24 18:15


     





 


 Aspirin  81 mg  DAILY


 PO  11/27/24 10:00


    12/1/24 09:33


81 MG


 


 Atorvastatin


 Calcium  40 mg  HS


 PO  11/26/24 22:00


    11/30/24 21:06


40 MG


 


 Morphine Sulfate  2 mg  Q30MP  PRN


 IV  11/26/24 18:15


     





 


 Acetaminophen  650 mg  Q6HP  PRN


 PO  11/26/24 18:15


    11/30/24 17:22


650 MG


 


 Docusate Sodium  100 mg  DAILY


 PO  11/27/24 10:00


    12/1/24 09:37


100 MG


 


 Ondansetron HCl  4 mg  Q4HP  PRN


 IV  11/26/24 18:15


     





 


 Nitroglycerin  0.4 mg  Q5MINP  PRN


 SL  11/26/24 18:15


     





 


 Albuterol  2.5 mg  Q4HR


 NEB  11/26/24 22:00


    12/1/24 09:55


2.5 MG


 


 Methylprednisolone


 Sodium Succinate  40 mg  BID


 IV  11/26/24 22:00


    12/1/24 09:32


40 MG


 


 Ceftriaxone Sodium  50 ml @ 


 100 mls/hr  DAILY@09


 IV  11/27/24 09:00


    12/1/24 08:33


100 MLS/HR


 


 Azithromycin  250 ml @ 


 125 mls/hr  DAILY


 IV  11/27/24 10:00


    12/1/24 09:32


125 MLS/HR


 


 Furosemide  20 mg  DAILY


 IV  11/29/24 10:00


    12/1/24 09:32


20 MG


 


 Heparin Sodium/


 Dextrose  250 ml @ 


 11 mls/hr  [id number]


 IV  11/29/24 08:30


    12/1/24 00:13


11 MLS/HR


 


 Diagnostic Test


  (Pha)  1 strip  ACHS


 VI  11/29/24 17:00


    11/30/24 21:17


1 STRIP


 


 Melatonin  5 mg  HS  PRN


 PO  11/29/24 22:00


    11/30/24 00:05


5 MG


 


 Insulin Human


 Regular    ACHS


 SC  11/29/24 22:00


    12/1/24 06:34


2 UNITS








Examination:  LUNGS:Abnormal (On high-flow O2 at 50L with FiO2 at 40%), 

CVS:Normal, NEURO:Normal


laboratory and microbiology


                                Laboratory Tests


12/1/24 04:20

















Test


 12/1/24


04:20 Range/Units


 


 


Serum Glucose 148 H   mg/dL











Problem List/Assessment/Plan


Problem List/Assessment/Plan


NSTEMI with bilateral pulmonary emboli with evidence of RV strain, S1T3 pattern


Acute on chronic hypoxic respiratory failure


Left lung pneumonia


Hx of PE in 2009, off Xarelto x 1 mo.


Asthma exacerbation


Acute kidney injury


Pre-diabetes, newly diagnosed


Suboptimal medical therapy





Plan/Recommendation


(Dr. Sanchez)





Transthoracic echocardiogram revealed EF 55%  with a moderately dilated right 

ventricle, moderate/severe reduced right ventricular systolic function, and RVSP

at 65 mmHg.  The patient with a history of PE stopped Xarelto therapy 

approximately a month ago.  She now presents with bilateral PE R>L with evidence

of heart strain as well as PNA.  Continues of high-flow O2 currently on 50L at 

40% with O2 Saturations at 92-94%.   Continue heparin drip per pharmacy 

protocol.  ABX therapy per primary care team.  Following daily to re-evaluate 

for the need for possible thrombectomy.  Thank you for allowing us to 

participate in this patient's care.  Please call if you have any questions or 

concerns.





Critical care time:  30 min.  This medical document was created using an 

electronic medical record system with voice recognition software and 

computerized dictation system.  Although this document has been carefully 

reviewed, there might still be some phonetic and typographical errors.  

Occasional wrong-word or ``sound-alike substitutions may have occurred due to 

the inherent limitations of voice recognition software.  These areas are purely 

typographical due to imperfections of the software programs and do not reflect 

any compromise in the patient's medical care.  Please read the chart carefully 

and recognize, using context, where these substitutions have occurred.


Plan discussed with:  Patient, Other





Date of Service:  Dec 1, 2024


Billing Provider:  YO SANCHEZ MD


Cardiology Common Codes:  54876-INRHRIUW CARE-EACH +30MIN











DAJUAN FARRAR [facility]            Dec 1, 2024 13:05

## 2024-12-02 VITALS
HEART RATE: 89 BPM | DIASTOLIC BLOOD PRESSURE: 72 MMHG | OXYGEN SATURATION: 96 % | RESPIRATION RATE: 15 BRPM | SYSTOLIC BLOOD PRESSURE: 120 MMHG

## 2024-12-02 VITALS
HEART RATE: 89 BPM | RESPIRATION RATE: 15 BRPM | DIASTOLIC BLOOD PRESSURE: 81 MMHG | SYSTOLIC BLOOD PRESSURE: 123 MMHG | OXYGEN SATURATION: 96 %

## 2024-12-02 VITALS — OXYGEN SATURATION: 94 % | RESPIRATION RATE: 17 BRPM | HEART RATE: 75 BPM

## 2024-12-02 VITALS — HEART RATE: 88 BPM | RESPIRATION RATE: 20 BRPM | OXYGEN SATURATION: 93 %

## 2024-12-02 VITALS
OXYGEN SATURATION: 94 % | HEART RATE: 77 BPM | RESPIRATION RATE: 12 BRPM | SYSTOLIC BLOOD PRESSURE: 133 MMHG | DIASTOLIC BLOOD PRESSURE: 72 MMHG

## 2024-12-02 VITALS
OXYGEN SATURATION: 94 % | SYSTOLIC BLOOD PRESSURE: 127 MMHG | DIASTOLIC BLOOD PRESSURE: 71 MMHG | RESPIRATION RATE: 20 BRPM | HEART RATE: 97 BPM

## 2024-12-02 VITALS
DIASTOLIC BLOOD PRESSURE: 60 MMHG | SYSTOLIC BLOOD PRESSURE: 112 MMHG | RESPIRATION RATE: 19 BRPM | OXYGEN SATURATION: 93 % | HEART RATE: 75 BPM

## 2024-12-02 VITALS — HEART RATE: 78 BPM

## 2024-12-02 VITALS — RESPIRATION RATE: 14 BRPM | HEART RATE: 84 BPM | OXYGEN SATURATION: 97 %

## 2024-12-02 VITALS
OXYGEN SATURATION: 94 % | RESPIRATION RATE: 15 BRPM | HEART RATE: 86 BPM | TEMPERATURE: 98.5 F | SYSTOLIC BLOOD PRESSURE: 133 MMHG | DIASTOLIC BLOOD PRESSURE: 79 MMHG

## 2024-12-02 VITALS — HEART RATE: 84 BPM | RESPIRATION RATE: 18 BRPM | OXYGEN SATURATION: 95 %

## 2024-12-02 VITALS
RESPIRATION RATE: 16 BRPM | DIASTOLIC BLOOD PRESSURE: 77 MMHG | HEART RATE: 83 BPM | SYSTOLIC BLOOD PRESSURE: 155 MMHG | OXYGEN SATURATION: 94 %

## 2024-12-02 VITALS
HEART RATE: 94 BPM | DIASTOLIC BLOOD PRESSURE: 74 MMHG | OXYGEN SATURATION: 92 % | SYSTOLIC BLOOD PRESSURE: 120 MMHG | RESPIRATION RATE: 24 BRPM

## 2024-12-02 VITALS — RESPIRATION RATE: 13 BRPM | OXYGEN SATURATION: 98 % | HEART RATE: 82 BPM

## 2024-12-02 VITALS
DIASTOLIC BLOOD PRESSURE: 81 MMHG | SYSTOLIC BLOOD PRESSURE: 123 MMHG | OXYGEN SATURATION: 95 % | HEART RATE: 83 BPM | RESPIRATION RATE: 11 BRPM

## 2024-12-02 VITALS
SYSTOLIC BLOOD PRESSURE: 148 MMHG | HEART RATE: 83 BPM | RESPIRATION RATE: 14 BRPM | DIASTOLIC BLOOD PRESSURE: 89 MMHG | TEMPERATURE: 98 F | OXYGEN SATURATION: 92 %

## 2024-12-02 VITALS — OXYGEN SATURATION: 94 % | HEART RATE: 86 BPM | RESPIRATION RATE: 13 BRPM

## 2024-12-02 VITALS — HEART RATE: 87 BPM | RESPIRATION RATE: 16 BRPM | OXYGEN SATURATION: 92 %

## 2024-12-02 VITALS
OXYGEN SATURATION: 94 % | RESPIRATION RATE: 12 BRPM | HEART RATE: 76 BPM | DIASTOLIC BLOOD PRESSURE: 83 MMHG | SYSTOLIC BLOOD PRESSURE: 139 MMHG

## 2024-12-02 VITALS
HEART RATE: 82 BPM | RESPIRATION RATE: 15 BRPM | SYSTOLIC BLOOD PRESSURE: 124 MMHG | DIASTOLIC BLOOD PRESSURE: 69 MMHG | OXYGEN SATURATION: 95 %

## 2024-12-02 VITALS
RESPIRATION RATE: 14 BRPM | HEART RATE: 74 BPM | OXYGEN SATURATION: 95 % | SYSTOLIC BLOOD PRESSURE: 124 MMHG | DIASTOLIC BLOOD PRESSURE: 71 MMHG

## 2024-12-02 VITALS — OXYGEN SATURATION: 93 % | RESPIRATION RATE: 15 BRPM | HEART RATE: 88 BPM

## 2024-12-02 VITALS
RESPIRATION RATE: 12 BRPM | SYSTOLIC BLOOD PRESSURE: 121 MMHG | HEART RATE: 84 BPM | OXYGEN SATURATION: 96 % | DIASTOLIC BLOOD PRESSURE: 75 MMHG

## 2024-12-02 VITALS
HEART RATE: 74 BPM | DIASTOLIC BLOOD PRESSURE: 64 MMHG | SYSTOLIC BLOOD PRESSURE: 112 MMHG | RESPIRATION RATE: 14 BRPM | OXYGEN SATURATION: 96 %

## 2024-12-02 VITALS — OXYGEN SATURATION: 97 % | RESPIRATION RATE: 13 BRPM | HEART RATE: 84 BPM

## 2024-12-02 VITALS — OXYGEN SATURATION: 94 % | RESPIRATION RATE: 18 BRPM | HEART RATE: 85 BPM

## 2024-12-02 VITALS — RESPIRATION RATE: 16 BRPM | HEART RATE: 86 BPM | OXYGEN SATURATION: 96 %

## 2024-12-02 VITALS — OXYGEN SATURATION: 91 % | HEART RATE: 92 BPM | RESPIRATION RATE: 22 BRPM

## 2024-12-02 VITALS — RESPIRATION RATE: 14 BRPM | OXYGEN SATURATION: 93 % | HEART RATE: 94 BPM

## 2024-12-02 VITALS — OXYGEN SATURATION: 94 % | HEART RATE: 84 BPM | RESPIRATION RATE: 21 BRPM

## 2024-12-02 VITALS
DIASTOLIC BLOOD PRESSURE: 75 MMHG | HEART RATE: 79 BPM | OXYGEN SATURATION: 98 % | SYSTOLIC BLOOD PRESSURE: 138 MMHG | RESPIRATION RATE: 14 BRPM

## 2024-12-02 VITALS
OXYGEN SATURATION: 95 % | HEART RATE: 90 BPM | DIASTOLIC BLOOD PRESSURE: 79 MMHG | SYSTOLIC BLOOD PRESSURE: 145 MMHG | RESPIRATION RATE: 15 BRPM

## 2024-12-02 VITALS — HEART RATE: 88 BPM | RESPIRATION RATE: 19 BRPM | OXYGEN SATURATION: 98 %

## 2024-12-02 VITALS — HEART RATE: 77 BPM | OXYGEN SATURATION: 95 % | RESPIRATION RATE: 13 BRPM

## 2024-12-02 VITALS
RESPIRATION RATE: 14 BRPM | SYSTOLIC BLOOD PRESSURE: 114 MMHG | DIASTOLIC BLOOD PRESSURE: 72 MMHG | OXYGEN SATURATION: 95 % | HEART RATE: 85 BPM

## 2024-12-02 VITALS — HEART RATE: 93 BPM | OXYGEN SATURATION: 90 % | RESPIRATION RATE: 19 BRPM

## 2024-12-02 VITALS — OXYGEN SATURATION: 93 % | HEART RATE: 81 BPM | RESPIRATION RATE: 16 BRPM

## 2024-12-02 VITALS
HEART RATE: 77 BPM | OXYGEN SATURATION: 96 % | DIASTOLIC BLOOD PRESSURE: 69 MMHG | SYSTOLIC BLOOD PRESSURE: 124 MMHG | RESPIRATION RATE: 16 BRPM

## 2024-12-02 VITALS
DIASTOLIC BLOOD PRESSURE: 92 MMHG | RESPIRATION RATE: 13 BRPM | SYSTOLIC BLOOD PRESSURE: 139 MMHG | OXYGEN SATURATION: 96 % | HEART RATE: 80 BPM

## 2024-12-02 VITALS
DIASTOLIC BLOOD PRESSURE: 75 MMHG | SYSTOLIC BLOOD PRESSURE: 138 MMHG | HEART RATE: 81 BPM | RESPIRATION RATE: 15 BRPM | OXYGEN SATURATION: 98 %

## 2024-12-02 VITALS — HEART RATE: 84 BPM | OXYGEN SATURATION: 95 % | RESPIRATION RATE: 13 BRPM

## 2024-12-02 VITALS
DIASTOLIC BLOOD PRESSURE: 79 MMHG | SYSTOLIC BLOOD PRESSURE: 117 MMHG | TEMPERATURE: 98 F | OXYGEN SATURATION: 95 % | HEART RATE: 77 BPM | RESPIRATION RATE: 14 BRPM

## 2024-12-02 VITALS — RESPIRATION RATE: 13 BRPM | HEART RATE: 82 BPM | OXYGEN SATURATION: 94 %

## 2024-12-02 VITALS
SYSTOLIC BLOOD PRESSURE: 122 MMHG | HEART RATE: 84 BPM | DIASTOLIC BLOOD PRESSURE: 77 MMHG | OXYGEN SATURATION: 94 % | RESPIRATION RATE: 17 BRPM

## 2024-12-02 VITALS — HEART RATE: 94 BPM | OXYGEN SATURATION: 89 % | RESPIRATION RATE: 19 BRPM

## 2024-12-02 VITALS — HEART RATE: 80 BPM | OXYGEN SATURATION: 95 % | RESPIRATION RATE: 14 BRPM

## 2024-12-02 VITALS — HEART RATE: 85 BPM | RESPIRATION RATE: 16 BRPM | OXYGEN SATURATION: 93 %

## 2024-12-02 VITALS — HEART RATE: 85 BPM | OXYGEN SATURATION: 96 % | RESPIRATION RATE: 16 BRPM

## 2024-12-02 VITALS — HEART RATE: 82 BPM | RESPIRATION RATE: 14 BRPM | OXYGEN SATURATION: 95 %

## 2024-12-02 VITALS — OXYGEN SATURATION: 94 % | HEART RATE: 86 BPM | RESPIRATION RATE: 24 BRPM

## 2024-12-02 VITALS — RESPIRATION RATE: 13 BRPM | OXYGEN SATURATION: 96 % | HEART RATE: 84 BPM

## 2024-12-02 VITALS — HEART RATE: 92 BPM | OXYGEN SATURATION: 94 % | RESPIRATION RATE: 21 BRPM

## 2024-12-02 VITALS — HEART RATE: 87 BPM | OXYGEN SATURATION: 90 % | RESPIRATION RATE: 18 BRPM

## 2024-12-02 VITALS — OXYGEN SATURATION: 95 % | RESPIRATION RATE: 15 BRPM | HEART RATE: 82 BPM

## 2024-12-02 VITALS — OXYGEN SATURATION: 94 % | HEART RATE: 79 BPM | RESPIRATION RATE: 19 BRPM

## 2024-12-02 VITALS — RESPIRATION RATE: 15 BRPM | OXYGEN SATURATION: 95 % | HEART RATE: 91 BPM

## 2024-12-02 VITALS — HEART RATE: 82 BPM | RESPIRATION RATE: 14 BRPM | OXYGEN SATURATION: 94 %

## 2024-12-02 VITALS — RESPIRATION RATE: 14 BRPM | OXYGEN SATURATION: 94 % | HEART RATE: 91 BPM

## 2024-12-02 VITALS
OXYGEN SATURATION: 96 % | RESPIRATION RATE: 17 BRPM | DIASTOLIC BLOOD PRESSURE: 82 MMHG | HEART RATE: 65 BPM | SYSTOLIC BLOOD PRESSURE: 130 MMHG

## 2024-12-02 VITALS — OXYGEN SATURATION: 95 % | HEART RATE: 65 BPM | RESPIRATION RATE: 16 BRPM

## 2024-12-02 VITALS — OXYGEN SATURATION: 93 % | RESPIRATION RATE: 12 BRPM | HEART RATE: 82 BPM

## 2024-12-02 VITALS — OXYGEN SATURATION: 95 % | RESPIRATION RATE: 15 BRPM | HEART RATE: 83 BPM

## 2024-12-02 VITALS — RESPIRATION RATE: 18 BRPM | OXYGEN SATURATION: 93 % | HEART RATE: 85 BPM

## 2024-12-02 VITALS
SYSTOLIC BLOOD PRESSURE: 130 MMHG | HEART RATE: 76 BPM | OXYGEN SATURATION: 95 % | DIASTOLIC BLOOD PRESSURE: 82 MMHG | RESPIRATION RATE: 17 BRPM

## 2024-12-02 VITALS — RESPIRATION RATE: 14 BRPM | HEART RATE: 88 BPM | OXYGEN SATURATION: 96 %

## 2024-12-02 LAB
ALBUMIN SERPL-MCNC: 3.8 G/DL (ref 3.2–4.8)
ALP SERPL-CCNC: 58 U/L (ref 46–116)
ALT SERPL-CCNC: 19 U/L (ref 7–40)
ANION GAP SERPL CALCULATED.3IONS-SCNC: 8 MMOL/L (ref 5–15)
APTT PPP: 61.5 SEC (ref 24.5–34.5)
BASE EXCESS BLDV CALC-SCNC: 5.2 MMOL/L (ref -2–3)
BILIRUB SERPL-MCNC: 0.8 MG/DL (ref 0.2–1)
BUN SERPL-MCNC: 26 MG/DL (ref 9–23)
BUN/CREAT SERPL: 30.2 (ref 10–20)
CALCIUM SERPL-MCNC: 10 MG/DL (ref 8.7–10.4)
CHLORIDE SERPL-SCNC: 102 MMOL/L (ref 98–107)
CO2 SERPL-SCNC: 31 MMOL/L (ref 20–31)
GLUCOSE SERPL-MCNC: 133 MG/DL (ref 74–106)
HCT VFR BLD AUTO: 45.3 % (ref 36–46)
HGB BLD-MCNC: 15.4 G/DL (ref 12.2–16.2)
INR PPP: 1.11 (ref 0.9–1.15)
MAGNESIUM SERPL-MCNC: 1.8 MG/DL (ref 1.6–2.6)
MCH RBC QN AUTO: 34.4 PG (ref 28–32)
MCV RBC AUTO: 101.3 FL (ref 80–100)
NRBC BLD QL AUTO: 0.1 %
POTASSIUM SERPL-SCNC: 4.5 MMOL/L (ref 3.5–5.1)
PROT SERPL-MCNC: 5.8 G/DL (ref 5.7–8.2)
PROT UR STRIP.AUTO-MCNC: (no result) MG/DL
PROTHROMBIN TIME: 11.7 SEC (ref 9.3–11.8)
SODIUM SERPL-SCNC: 141 MMOL/L (ref 136–145)

## 2024-12-02 NOTE — DVHPN2
Progress Note


Date Seen:  Dec 2, 2024


Medical Necessity Reason


Pt with a Central, PICC or Fol:  Yes


The following are medically ne:  Mi Catheter


Reason for mi catheter:  Strict I&O





Subjective


Patient reports:  No new complaints


Review of Systems:  HEENT:Normal, CVS:Normal, RESPIRATORY:Normal, GI:Normal, 

:Normal, MSK:Normal, NEURO:Normal





Objective


vital signs





                                   Vital Sign








  Date Time  Temp Pulse Resp B/P (MAP) Pulse Ox O2 Delivery O2 Flow Rate FiO2


 


12/2/24 08:30  85 18  93   


 


12/2/24 08:00      Hi-Flow Heated NC+ 60 50





        50


 


12/2/24 04:00 98.0       





 98.0       














                           Total Intake and Output   


 


 12/1/24 12/1/24 12/2/24





 15:00 23:00 07:00


 


Intake Total 388 ml 688 ml 199 ml


 


Output Total  1010 ml 325 ml


 


Balance 388 ml -322 ml -126 ml








medications





                               Current Medications








 Medications  Dose


 Ordered  Sig/Brian


 Route  Start Time


 Stop Time Status Last Admin


Dose Admin


 


 Dextrose  50 ml  UD  PRN


 IV  11/26/24 18:15


     





 


 Aspirin  81 mg  DAILY


 PO  11/27/24 10:00


    12/1/24 09:33


81 MG


 


 Atorvastatin


 Calcium  40 mg  HS


 PO  11/26/24 22:00


    12/1/24 22:04


40 MG


 


 Morphine Sulfate  2 mg  Q30MP  PRN


 IV  11/26/24 18:15


     





 


 Acetaminophen  650 mg  Q6HP  PRN


 PO  11/26/24 18:15


    11/30/24 17:22


650 MG


 


 Docusate Sodium  100 mg  DAILY


 PO  11/27/24 10:00


    12/1/24 09:37


100 MG


 


 Ondansetron HCl  4 mg  Q4HP  PRN


 IV  11/26/24 18:15


     





 


 Nitroglycerin  0.4 mg  Q5MINP  PRN


 SL  11/26/24 18:15


     





 


 Albuterol  2.5 mg  Q4HR


 NEB  11/26/24 22:00


    12/2/24 07:04


2.5 MG


 


 Methylprednisolone


 Sodium Succinate  40 mg  BID


 IV  11/26/24 22:00


    12/1/24 22:04


40 MG


 


 Ceftriaxone Sodium  50 ml @ 


 100 mls/hr  DAILY@09


 IV  11/27/24 09:00


    12/2/24 08:41


100 MLS/HR


 


 Azithromycin  250 ml @ 


 125 mls/hr  DAILY


 IV  11/27/24 10:00


    12/1/24 09:32


125 MLS/HR


 


 Furosemide  20 mg  DAILY


 IV  11/29/24 10:00


    12/1/24 09:32


20 MG


 


 Heparin Sodium/


 Dextrose  250 ml @ 


 11 mls/hr  [id number]


 IV  11/29/24 08:30


    12/2/24 01:34


11 MLS/HR


 


 Diagnostic Test


  (Pha)  1 strip  ACHS


 VI  11/29/24 17:00


    12/2/24 06:32


1 STRIP


 


 Melatonin  5 mg  HS  PRN


 PO  11/29/24 22:00


    12/1/24 20:22


5 MG


 


 Insulin Human


 Regular    ACHS


 SC  11/29/24 22:00


    12/2/24 06:32


2 UNITS








Examination:  GENERAL:Normal, HEENT:Normal, NECK:Normal, LUNGS:Normal, 

LUNGS:Abnormal (on high flow oxygen), CVS:Normal, ABDOMEN:Normal, MSK:Normal, 

SKIN:Normal, NEURO:Normal, :Normal


laboratory and microbiology


                                Laboratory Tests


12/2/24 05:21

















Test


 12/2/24


05:21 Range/Units


 


 


Serum Glucose 133 H   mg/dL








                                  Microbiology








 Date/Time


Source Procedure


Growth Status





 


 11/30/24 00:00


Nose MRSA Screen - Final


 Complete











Problem List/Assessment/Plan


Problem List/Assessment/Plan


#1 acute resp failure: cont high flow oxygen


#2 acute PE: on heparin drip


#3 asthma


#4 htn


#5 obesity


#6 nstemi ?type 2


#7 acute diastolic heart failure likely due to pe/rv strain


#8 ?pneumonia: on antibiotics


#9 acute renal failure ?vasomotor nephropathy


Plan discussed with:  Patient





My Orders


My Orders





                         Orders - JIMI GRACE MD








Procedure Category Date Status





  Time 


 


Urinalysis LAB 12/2/24 Uncollected





  09:39 


 


Basic Metabolic Panel LAB 12/3/24 Verified





  06:00 


 


Complete Blood Count LAB 12/3/24 Verified





  06:00 








Critical Care Time (mins):  41 (critical care time 41 mins)





Date of Service:  Dec 2, 2024


Billing Provider:  JIMI GRACE MD


Common Visit Codes:  45590-[id number] CARE 30-74 MIN











JIMI GRACE MD          Dec 2, 2024 09:45

## 2024-12-02 NOTE — DVHPN2
Consult Progress Note


Date Seen:  Dec 2, 2024


Subjective


Other Systems:  


Patient remains on HiFlow nasal cannula 50L, FIO2 50%.





Objective


vital signs





                                   Vital Sign








  Date Time  Temp Pulse Resp B/P (MAP) Pulse Ox O2 Delivery O2 Flow Rate FiO2


 


12/2/24 08:30  85 18  93   


 


12/2/24 08:00      Hi-Flow Heated NC+ 60 50





        50


 


12/2/24 04:00 98.0       





 98.0       














                           Total Intake and Output   


 


 12/1/24 12/1/24 12/2/24





 15:00 23:00 07:00


 


Intake Total 388 ml 688 ml 199 ml


 


Output Total  1010 ml 325 ml


 


Balance 388 ml -322 ml -126 ml








medications





                               Current Medications








 Medications  Dose


 Ordered  Sig/Brian


 Route  Start Time


 Stop Time Status Last Admin


Dose Admin


 


 Dextrose  50 ml  UD  PRN


 IV  11/26/24 18:15


     





 


 Aspirin  81 mg  DAILY


 PO  11/27/24 10:00


    12/2/24 10:06


81 MG


 


 Atorvastatin


 Calcium  40 mg  HS


 PO  11/26/24 22:00


    12/1/24 22:04


40 MG


 


 Morphine Sulfate  2 mg  Q30MP  PRN


 IV  11/26/24 18:15


     





 


 Acetaminophen  650 mg  Q6HP  PRN


 PO  11/26/24 18:15


    11/30/24 17:22


650 MG


 


 Docusate Sodium  100 mg  DAILY


 PO  11/27/24 10:00


    12/1/24 09:37


100 MG


 


 Ondansetron HCl  4 mg  Q4HP  PRN


 IV  11/26/24 18:15


     





 


 Nitroglycerin  0.4 mg  Q5MINP  PRN


 SL  11/26/24 18:15


     





 


 Albuterol  2.5 mg  Q4HR


 NEB  11/26/24 22:00


    12/2/24 07:04


2.5 MG


 


 Ceftriaxone Sodium  50 ml @ 


 100 mls/hr  DAILY@09


 IV  11/27/24 09:00


    12/2/24 08:41


100 MLS/HR


 


 Azithromycin  250 ml @ 


 125 mls/hr  DAILY


 IV  11/27/24 10:00


    12/2/24 10:06


125 MLS/HR


 


 Heparin Sodium/


 Dextrose  250 ml @ 


 11 mls/hr  [id number]


 IV  11/29/24 08:30


    12/2/24 01:34


11 MLS/HR


 


 Diagnostic Test


  (Pha)  1 strip  ACHS


 VI  11/29/24 17:00


    12/2/24 06:32


1 STRIP


 


 Melatonin  5 mg  HS  PRN


 PO  11/29/24 22:00


    12/1/24 20:22


5 MG


 


 Insulin Human


 Regular    ACHS


 SC  11/29/24 22:00


    12/2/24 06:32


2 UNITS








Examination:  GENERAL:Normal, LUNGS:Abnormal (Diminished bilateral lower lobes),

CVS:Normal, NEURO:Normal


laboratory and microbiology


                                Laboratory Tests


12/2/24 05:21

















Test


 12/2/24


05:21 Range/Units


 


 


Serum Glucose 133 H   mg/dL











Problem List/Assessment/Plan


Problem List/Assessment/Plan


Bilateral pulmonary emboli with evidence of right heart strain


NSTEMI type II secondary to above


Mild-to-moderate tricuspid valve regurgitation


Acute on chronic hypoxic respiratory failure


Hx of PE in 2009, off Xarelto x 1 mo.


Left lung pneumonia 


Asthma exacerbation


Acute kidney injury


Suboptimal medical therapy


Medication noncompliance








Plan/Recommendation


(Dr. Sanchez)





Transthoracic echocardiogram reveals EF 55%, RVSP 65 mmHg.  A CT angio chest 

with contrast revealed bilateral pulmonary emboli with evidence of right heart 

strain.  Patient was initiated on a heparin drip.  Patient continues on Hi flow 

nasal cannula with oxygen requirement at 50L, FIO2 50%.  O2 saturations 

maintaining 94-95% on monitor.  Patient seen and examined at bedside with 

.  Given that the patient still requiring high oxygen, we will 

schedule the patient for a thrombectomy on 12/3/24.  Plan and procedure 

discussed with the patient full detail.  Patient was agreeable.  Thank you for 

allowing us to care for this patient. Please call with any questions or 

concerns.





Critical care time:  40 min.  This medical document was created using an 

electronic medical record system with voice recognition software and 

computerized dictation system.  Although this document has been carefully 

reviewed, there might still be some phonetic and typographical errors.  

Occasional wrong-word or ``sound-alike substitutions may have occurred due to 

the inherent limitations of voice recognition software.  These areas are purely 

typographical due to imperfections of the software programs and do not reflect 

any compromise in the patient's medical care.  Please read the chart carefully 

and recognize, using context, where these substitutions have occurred.


Plan discussed with:  Patient





Date of Service:  Dec 2, 2024


Billing Provider:  YO SANCHEZ MD


Cardiology Common Codes:  61178-[id number] CARE 30-74 MIN











DOC ALVARADO              Dec 2, 2024 10:14

## 2024-12-02 NOTE — DVH
CHEST RADIOGRAPH



Indication: Follow up and acute respiratory failure. Thank You!



Technique: 



 Single AP portable chest radiograph was obtained.



Comparison: XY CHEST PORTABLE on DOS: 12/1/24, XY CHEST PORTABLE on DOS: 11/30/24, XY CHEST PORTABLE 
on DOS: 11/28/24, XY CHEST PORTABLE on DOS: 11/26/24, XY CHEST PORTABLE on DOS: 12/1/24



FINDINGS: 



cardiomegaly and prominence of the pulmonary vasculature, similar to the prior exam.  No focal consol
idation. No other significant interval change.



IMPRESSION: 



Cardiomegaly and prominence of the pulmonary vasculature May suggest a degree of pulmonary vascular c
ongestion in the appropriate clinical setting.



No focal consolidation. 



Electronically Signed by: Gonzalez Gao at 12/02/2024 10:46:12 AM

## 2024-12-03 VITALS
SYSTOLIC BLOOD PRESSURE: 105 MMHG | RESPIRATION RATE: 14 BRPM | DIASTOLIC BLOOD PRESSURE: 72 MMHG | HEART RATE: 76 BPM | OXYGEN SATURATION: 97 %

## 2024-12-03 VITALS
HEART RATE: 89 BPM | RESPIRATION RATE: 20 BRPM | SYSTOLIC BLOOD PRESSURE: 120 MMHG | DIASTOLIC BLOOD PRESSURE: 79 MMHG | OXYGEN SATURATION: 94 %

## 2024-12-03 VITALS — DIASTOLIC BLOOD PRESSURE: 60 MMHG | SYSTOLIC BLOOD PRESSURE: 107 MMHG

## 2024-12-03 VITALS — HEART RATE: 63 BPM | RESPIRATION RATE: 14 BRPM | OXYGEN SATURATION: 95 %

## 2024-12-03 VITALS
SYSTOLIC BLOOD PRESSURE: 125 MMHG | HEART RATE: 77 BPM | OXYGEN SATURATION: 95 % | TEMPERATURE: 98.1 F | DIASTOLIC BLOOD PRESSURE: 73 MMHG | RESPIRATION RATE: 14 BRPM

## 2024-12-03 VITALS
SYSTOLIC BLOOD PRESSURE: 118 MMHG | HEART RATE: 82 BPM | DIASTOLIC BLOOD PRESSURE: 70 MMHG | RESPIRATION RATE: 13 BRPM | OXYGEN SATURATION: 97 %

## 2024-12-03 VITALS — HEART RATE: 83 BPM | OXYGEN SATURATION: 97 % | RESPIRATION RATE: 16 BRPM

## 2024-12-03 VITALS — RESPIRATION RATE: 19 BRPM | HEART RATE: 96 BPM | OXYGEN SATURATION: 92 %

## 2024-12-03 VITALS
OXYGEN SATURATION: 95 % | RESPIRATION RATE: 20 BRPM | DIASTOLIC BLOOD PRESSURE: 66 MMHG | SYSTOLIC BLOOD PRESSURE: 120 MMHG | HEART RATE: 78 BPM

## 2024-12-03 VITALS
DIASTOLIC BLOOD PRESSURE: 78 MMHG | SYSTOLIC BLOOD PRESSURE: 123 MMHG | RESPIRATION RATE: 12 BRPM | HEART RATE: 77 BPM | OXYGEN SATURATION: 95 %

## 2024-12-03 VITALS
DIASTOLIC BLOOD PRESSURE: 75 MMHG | SYSTOLIC BLOOD PRESSURE: 118 MMHG | RESPIRATION RATE: 12 BRPM | HEART RATE: 76 BPM | OXYGEN SATURATION: 95 %

## 2024-12-03 VITALS
OXYGEN SATURATION: 90 % | DIASTOLIC BLOOD PRESSURE: 67 MMHG | SYSTOLIC BLOOD PRESSURE: 117 MMHG | HEART RATE: 98 BPM | RESPIRATION RATE: 18 BRPM

## 2024-12-03 VITALS — HEART RATE: 80 BPM | OXYGEN SATURATION: 98 % | RESPIRATION RATE: 13 BRPM

## 2024-12-03 VITALS — RESPIRATION RATE: 14 BRPM | OXYGEN SATURATION: 94 % | HEART RATE: 64 BPM

## 2024-12-03 VITALS — RESPIRATION RATE: 15 BRPM | HEART RATE: 73 BPM | OXYGEN SATURATION: 100 %

## 2024-12-03 VITALS — OXYGEN SATURATION: 95 % | RESPIRATION RATE: 13 BRPM | HEART RATE: 66 BPM

## 2024-12-03 VITALS — OXYGEN SATURATION: 100 % | RESPIRATION RATE: 14 BRPM | HEART RATE: 75 BPM

## 2024-12-03 VITALS — HEART RATE: 83 BPM | RESPIRATION RATE: 17 BRPM | OXYGEN SATURATION: 97 %

## 2024-12-03 VITALS — OXYGEN SATURATION: 97 % | HEART RATE: 86 BPM | RESPIRATION RATE: 20 BRPM

## 2024-12-03 VITALS — RESPIRATION RATE: 25 BRPM | OXYGEN SATURATION: 91 % | HEART RATE: 87 BPM

## 2024-12-03 VITALS — OXYGEN SATURATION: 97 % | HEART RATE: 83 BPM | RESPIRATION RATE: 20 BRPM

## 2024-12-03 VITALS — OXYGEN SATURATION: 96 % | RESPIRATION RATE: 19 BRPM | HEART RATE: 84 BPM

## 2024-12-03 VITALS — HEART RATE: 73 BPM | RESPIRATION RATE: 20 BRPM | OXYGEN SATURATION: 95 %

## 2024-12-03 VITALS
SYSTOLIC BLOOD PRESSURE: 132 MMHG | DIASTOLIC BLOOD PRESSURE: 69 MMHG | RESPIRATION RATE: 18 BRPM | OXYGEN SATURATION: 95 % | HEART RATE: 89 BPM

## 2024-12-03 VITALS
TEMPERATURE: 98.1 F | OXYGEN SATURATION: 92 % | DIASTOLIC BLOOD PRESSURE: 89 MMHG | HEART RATE: 82 BPM | RESPIRATION RATE: 17 BRPM | SYSTOLIC BLOOD PRESSURE: 143 MMHG

## 2024-12-03 VITALS — OXYGEN SATURATION: 99 % | HEART RATE: 76 BPM | RESPIRATION RATE: 16 BRPM

## 2024-12-03 VITALS — OXYGEN SATURATION: 95 % | HEART RATE: 81 BPM | RESPIRATION RATE: 15 BRPM

## 2024-12-03 VITALS — HEART RATE: 87 BPM | OXYGEN SATURATION: 95 % | RESPIRATION RATE: 15 BRPM

## 2024-12-03 VITALS
RESPIRATION RATE: 21 BRPM | DIASTOLIC BLOOD PRESSURE: 67 MMHG | HEART RATE: 81 BPM | OXYGEN SATURATION: 95 % | SYSTOLIC BLOOD PRESSURE: 117 MMHG

## 2024-12-03 VITALS
SYSTOLIC BLOOD PRESSURE: 110 MMHG | HEART RATE: 80 BPM | DIASTOLIC BLOOD PRESSURE: 55 MMHG | RESPIRATION RATE: 16 BRPM | OXYGEN SATURATION: 91 %

## 2024-12-03 VITALS
TEMPERATURE: 98.1 F | DIASTOLIC BLOOD PRESSURE: 57 MMHG | OXYGEN SATURATION: 95 % | SYSTOLIC BLOOD PRESSURE: 104 MMHG | RESPIRATION RATE: 18 BRPM | HEART RATE: 88 BPM

## 2024-12-03 VITALS
DIASTOLIC BLOOD PRESSURE: 58 MMHG | HEART RATE: 79 BPM | OXYGEN SATURATION: 97 % | RESPIRATION RATE: 18 BRPM | SYSTOLIC BLOOD PRESSURE: 114 MMHG

## 2024-12-03 VITALS — OXYGEN SATURATION: 93 % | RESPIRATION RATE: 17 BRPM | HEART RATE: 85 BPM

## 2024-12-03 VITALS
SYSTOLIC BLOOD PRESSURE: 120 MMHG | RESPIRATION RATE: 16 BRPM | OXYGEN SATURATION: 96 % | DIASTOLIC BLOOD PRESSURE: 66 MMHG | HEART RATE: 72 BPM

## 2024-12-03 VITALS — OXYGEN SATURATION: 95 % | RESPIRATION RATE: 17 BRPM | HEART RATE: 83 BPM

## 2024-12-03 VITALS — OXYGEN SATURATION: 97 % | HEART RATE: 76 BPM | RESPIRATION RATE: 14 BRPM

## 2024-12-03 VITALS — HEART RATE: 84 BPM | OXYGEN SATURATION: 96 % | RESPIRATION RATE: 16 BRPM

## 2024-12-03 VITALS — OXYGEN SATURATION: 96 % | RESPIRATION RATE: 15 BRPM | HEART RATE: 86 BPM

## 2024-12-03 VITALS
SYSTOLIC BLOOD PRESSURE: 124 MMHG | HEART RATE: 83 BPM | DIASTOLIC BLOOD PRESSURE: 72 MMHG | OXYGEN SATURATION: 95 % | RESPIRATION RATE: 21 BRPM

## 2024-12-03 VITALS — RESPIRATION RATE: 16 BRPM | OXYGEN SATURATION: 100 % | HEART RATE: 63 BPM

## 2024-12-03 VITALS — OXYGEN SATURATION: 100 % | HEART RATE: 77 BPM | RESPIRATION RATE: 16 BRPM

## 2024-12-03 VITALS — OXYGEN SATURATION: 98 % | RESPIRATION RATE: 14 BRPM | HEART RATE: 73 BPM

## 2024-12-03 VITALS
RESPIRATION RATE: 19 BRPM | SYSTOLIC BLOOD PRESSURE: 109 MMHG | HEART RATE: 89 BPM | OXYGEN SATURATION: 96 % | DIASTOLIC BLOOD PRESSURE: 66 MMHG

## 2024-12-03 VITALS — HEART RATE: 87 BPM | OXYGEN SATURATION: 98 % | RESPIRATION RATE: 22 BRPM

## 2024-12-03 VITALS — OXYGEN SATURATION: 100 % | HEART RATE: 73 BPM | RESPIRATION RATE: 15 BRPM

## 2024-12-03 VITALS — HEART RATE: 74 BPM | RESPIRATION RATE: 16 BRPM | OXYGEN SATURATION: 96 %

## 2024-12-03 VITALS — OXYGEN SATURATION: 97 % | RESPIRATION RATE: 18 BRPM | HEART RATE: 71 BPM

## 2024-12-03 VITALS — RESPIRATION RATE: 21 BRPM | HEART RATE: 84 BPM | OXYGEN SATURATION: 95 %

## 2024-12-03 VITALS — RESPIRATION RATE: 20 BRPM | HEART RATE: 79 BPM | OXYGEN SATURATION: 97 %

## 2024-12-03 VITALS
DIASTOLIC BLOOD PRESSURE: 73 MMHG | SYSTOLIC BLOOD PRESSURE: 135 MMHG | HEART RATE: 83 BPM | OXYGEN SATURATION: 94 % | RESPIRATION RATE: 18 BRPM

## 2024-12-03 VITALS
RESPIRATION RATE: 18 BRPM | SYSTOLIC BLOOD PRESSURE: 118 MMHG | TEMPERATURE: 98 F | OXYGEN SATURATION: 95 % | HEART RATE: 76 BPM | DIASTOLIC BLOOD PRESSURE: 73 MMHG

## 2024-12-03 VITALS — OXYGEN SATURATION: 100 %

## 2024-12-03 VITALS — OXYGEN SATURATION: 97 %

## 2024-12-03 VITALS — RESPIRATION RATE: 16 BRPM | OXYGEN SATURATION: 94 % | HEART RATE: 93 BPM

## 2024-12-03 VITALS — HEART RATE: 83 BPM | RESPIRATION RATE: 20 BRPM | OXYGEN SATURATION: 95 %

## 2024-12-03 VITALS — OXYGEN SATURATION: 100 % | RESPIRATION RATE: 18 BRPM | HEART RATE: 79 BPM

## 2024-12-03 VITALS — HEART RATE: 86 BPM | RESPIRATION RATE: 18 BRPM | OXYGEN SATURATION: 95 %

## 2024-12-03 VITALS — RESPIRATION RATE: 16 BRPM | TEMPERATURE: 98 F | OXYGEN SATURATION: 94 % | HEART RATE: 66 BPM

## 2024-12-03 VITALS — HEART RATE: 78 BPM | OXYGEN SATURATION: 100 % | RESPIRATION RATE: 16 BRPM

## 2024-12-03 VITALS — OXYGEN SATURATION: 96 % | HEART RATE: 82 BPM | RESPIRATION RATE: 15 BRPM

## 2024-12-03 VITALS — OXYGEN SATURATION: 94 % | RESPIRATION RATE: 20 BRPM | HEART RATE: 86 BPM

## 2024-12-03 VITALS — HEART RATE: 78 BPM | OXYGEN SATURATION: 95 % | RESPIRATION RATE: 12 BRPM

## 2024-12-03 VITALS — RESPIRATION RATE: 18 BRPM | OXYGEN SATURATION: 97 % | HEART RATE: 85 BPM

## 2024-12-03 VITALS — HEART RATE: 66 BPM

## 2024-12-03 LAB
ANION GAP SERPL CALCULATED.3IONS-SCNC: 7 MMOL/L (ref 5–15)
APTT PPP: 60 SEC (ref 24.5–34.5)
BUN SERPL-MCNC: 22 MG/DL (ref 9–23)
BUN/CREAT SERPL: 27.5 (ref 10–20)
CALCIUM SERPL-MCNC: 9.8 MG/DL (ref 8.7–10.4)
CHLORIDE SERPL-SCNC: 103 MMOL/L (ref 98–107)
CO2 SERPL-SCNC: 32 MMOL/L (ref 20–31)
GLUCOSE SERPL-MCNC: 92 MG/DL (ref 74–106)
HCT VFR BLD AUTO: 45.8 % (ref 36–46)
HGB BLD-MCNC: 15.4 G/DL (ref 12.2–16.2)
INR PPP: 1.06 (ref 0.9–1.15)
MCH RBC QN AUTO: 34.2 PG (ref 28–32)
MCV RBC AUTO: 101.4 FL (ref 80–100)
NRBC BLD QL AUTO: 0.1 %
POTASSIUM SERPL-SCNC: 3.8 MMOL/L (ref 3.5–5.1)
PROTHROMBIN TIME: 11.2 SEC (ref 9.3–11.8)
SODIUM SERPL-SCNC: 142 MMOL/L (ref 136–145)

## 2024-12-03 PROCEDURE — 5A0935A ASSISTANCE WITH RESPIRATORY VENTILATION, LESS THAN 24 CONSECUTIVE HOURS, HIGH NASAL FLOW/VELOCITY: ICD-10-PCS | Performed by: INTERNAL MEDICINE

## 2024-12-03 PROCEDURE — 4A023N6 MEASUREMENT OF CARDIAC SAMPLING AND PRESSURE, RIGHT HEART, PERCUTANEOUS APPROACH: ICD-10-PCS | Performed by: STUDENT IN AN ORGANIZED HEALTH CARE EDUCATION/TRAINING PROGRAM

## 2024-12-03 PROCEDURE — B31SYZZ FLUOROSCOPY OF RIGHT PULMONARY ARTERY USING OTHER CONTRAST: ICD-10-PCS | Performed by: STUDENT IN AN ORGANIZED HEALTH CARE EDUCATION/TRAINING PROGRAM

## 2024-12-03 PROCEDURE — B31TYZZ FLUOROSCOPY OF LEFT PULMONARY ARTERY USING OTHER CONTRAST: ICD-10-PCS | Performed by: STUDENT IN AN ORGANIZED HEALTH CARE EDUCATION/TRAINING PROGRAM

## 2024-12-03 PROCEDURE — 02CQ3ZZ EXTIRPATION OF MATTER FROM RIGHT PULMONARY ARTERY, PERCUTANEOUS APPROACH: ICD-10-PCS | Performed by: STUDENT IN AN ORGANIZED HEALTH CARE EDUCATION/TRAINING PROGRAM

## 2024-12-03 RX ADMIN — FENTANYL CITRATE ONE MCG: 50 INJECTION, SOLUTION INTRAMUSCULAR; INTRAVENOUS at 10:53

## 2024-12-03 RX ADMIN — IODIXANOL ONE MG: 320 INJECTION, SOLUTION INTRAVASCULAR at 11:43

## 2024-12-03 RX ADMIN — HEPARIN SODIUM ONE MLS/HR: 200 INJECTION, SOLUTION INTRAVENOUS at 11:41

## 2024-12-03 RX ADMIN — IODIXANOL ONE MG: 320 INJECTION, SOLUTION INTRAVASCULAR at 10:35

## 2024-12-03 RX ADMIN — MIDAZOLAM HYDROCHLORIDE ONE MG: 1 INJECTION, SOLUTION INTRAMUSCULAR; INTRAVENOUS at 10:53

## 2024-12-03 RX ADMIN — HEPARIN SODIUM ONE MLS/HR: 200 INJECTION, SOLUTION INTRAVENOUS at 10:35

## 2024-12-03 RX ADMIN — LIDOCAINE HYDROCHLORIDE ONE ML: 20 INJECTION, SOLUTION INFILTRATION; PERINEURAL at 10:53

## 2024-12-03 NOTE — DVHOP2
Operative Report - 2


Report Details


Date:


12/3/24


Preop Diagnosis:


Acute on chronic submassive pulmonary embolism.


Postop Diagnosis:  


Pulmonary angiogram was performed revealing mainly moderate burden of clot


seen in the right pulmonary artery and distal right interlobar artery.  An


attempt to retrieve clot was made using an artery clot retrieval device


with a mild white colored clot retrieval.  This indicates likely chronic


thromboembolic disease there is marginal improvement oxygen saturation of


the end of the procedure.


Surgeon:


Leonila Moss MD


Anesthesiologist:


Conscious sedation using25 mcg of fentanyl as well as a mg IV midazolam.  It was

given in the direct supervision of myself with the% attending nurses.  Patient 

was monitored for total of35 minutes without obvious complication.


Anesthesia:  Local


Consent:


The patient was informed of the risks and benefits of the procedure. These 

include but are not limited to death complications of anesthesia, postoperative 

infection, incomplete relief of symptoms, recurrence of symptoms, damage to 

blood vessels, nerves and tendons, deep venous thrombosis, pulmonary embolism 

and possible need for repeat surgery in the future.


Indications for Surgery:


This is a pleasant 64-year-old female who presented to the emergency room via 

EMS with a chief complaint of shortness of breath for two days.  The patient 

complains of progressive shortness of breath associated with a productive cough 

with yellowish sputum and chest pain described as substernal, sharp in nature, 

and worse with cough.  She was found with oxygen saturation levels of 87% on 

room air for which she was placed on supplemental oxygenation.  Upon arrival to 

the emergency room, the patient was on a non-rebreather mask at 15 

liters/minute.  At time of assessment, she was found on a BiPAP machine at 70%. 

Troponin levels are trending with latest in the 600s ng/L.  A 12-lead 

electrocardiogram revealed a sinus rhythm with anteroseptal/inferior ST changes 

as well as S1T3 pattern.   Of note, reports a history of pulmonary emboli 

diagnosed in 2009 and on Xarelto therapy.  Reports she ran out of Xarelto 

approximately a month ago.  Other medical history includes hypertension, severe 

asthma, depression, and remote history of tobacco use.





Name of Procedure Performed


1. Right heart catheterization using Ventura-Manuel catheter.  


2. Ultrasound-guided right femoral venous access.  


3. Right and left selective pulmonary angiography utilizing large bore catheter 

clot retriever.  


4. Clot retrieval using clot retrieval device as well as the 20 degree curve 

retriever.  


5. Conscious sedation using25 mcg of fentanyl as well as a mg IV midazolam.  


6. Figure of eight closure of the right femoral venous access with a flow stasis

device application.





Procedure Details


Procedure Details:


Procedural details of vascular access:  After informed consent was obtained 

risks, benefits, complications, alternatives were discussed in details patient 

who agrees to have the procedure done.  At the beginning of the procedure the 

right coronary artery were prepped and draped in the regular sterile fashion.  

Under ultrasound guidance we were able to identify the right femoral vein just 

medial to the right common femoral artery.  After stooling 10% of 1% xylocaine 

we were able to puncture the anterior wall of the right femoral vein under 

ultrasound guidance, followed which a micropuncture kit was advanced and finally

it was exchanged for a seven Chadian sheath right femoral venous access.  Right 

heart catheterization was conducted at this time findings were as follows: 


1. Right heart catheterization measurements:


1. Right atrial pressure was 15/17 with a mean of 6 mm of mercury.  


2. Right ventricular pressure was 75/5 with a mean of7 mm of mercury.  


3. Pulmonary artery pressure was 77/22 with a mean of43 mm of mercury.  


4. Pulmonary capillary wedge pressure was 18/27 with a mean of 19 mm of mercury.

 


2. Right and left selective pulmonary artery venography:


1. The main pulmonary confluence has no significant clot detected.  


2. The right pulmonary veins has mild-to-moderate distal filling defect 

involving the distal interlobar artery.  


3. The left pulmonary vein has no significant filling defects.  


Clot retriever applications: 


After measurement of the right heart catheterization was done using Ventura-Manuel 

catheter, a 300 choice PT wire was advanced through the Ventura-Manuel catheter 

before it was exchanged for a curved pigtail catheter.  This was then exchanged 

for Vitek catheter which was able to engage the right coronary system.  Then the

small wire was exchanged for a Super Stiff Amplatzer through the Vitek catheter 

before Chadian clot retriever sheath was advanced with the dilator all the way to

the distal part of the inferior vena cava.  And then with the help of the 

guiding catheter were able to engage the right pulmonary artery before the clot 

retriever as well as a dilated were able to be advanced and secured in position 

in the right pulmonary artery.  Once the clot retrieval was part of the main 

pulmonary artery we were able to advanced to the right side and we were able to 

do multiple runs of pots suction were able to retrieve minimal white colored 

clot likely indicating chronic thromboembolic disease.  We also used the curve 

clot a fever at 20 degree to engage the distal right interlobar artery.  There 

was minimal clot retriever.  Patient received auto transfusion of the blood was 

filter.  With a minimal blood loss.  The final hemodynamics were as follows the 

right atrial pressure was 17/28 with a mean of 14,


The right ventricular pressure was 71/3 with a mean of8 mm of mercury,


The pulmonary artery pressure was 76/32 with a mean of21 mm of mercury.  The 

most salient findings after attempted clot retrieval is the drop in the mean 

pulmonary artery [id number] mm of mercury.


Over the stiff wire we were able to retrieve the clot retriever and apply figure

of eight suture to the site of the puncture with the help of the flow stasis we 

were able to maintain hemostasis without obvious complication.


Impression and plan:


1. Successful attempt for clot retrieval of the right distal pulmonary artery as

well as the right interlobar distal artery.  With a minimal clot retriever and 

improvement in the pulmonary artery pressure.  


2. 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 

would need long-term anticoagulation and follow-up in the Pulmonary hypertension

Clinic.\]


3. Patient might benefit from rising white medication addition to long-term 

anticoagulation, patient would need to be seen in high specialized pulmonary 

hypertension clinic in a tertiary center.





Condition


Good





Disposition








 

















LEONILA MOSS MD             Dec 3, 2024 13:18

## 2024-12-03 NOTE — DVHPN2
Progress Note


Date Seen:  Dec 3, 2024


Medical Necessity Reason


Pt with a Central, PICC or Fol:  Yes


The following are medically ne:  Mi Catheter


Reason for mi catheter:  Strict I&O





Subjective


Patient reports:  No new complaints


Review of Systems:  HEENT:Normal, CVS:Normal, RESPIRATORY:Normal, GI:Normal, 

:Normal, MSK:Normal, NEURO:Normal





Objective


vital signs





                                   Vital Sign








  Date Time  Temp Pulse Resp B/P (MAP) Pulse Ox O2 Delivery O2 Flow Rate FiO2


 


12/3/24 14:19  87 22  98   


 


12/3/24 14:11      Oxymizer 12 N/A


 


12/3/24 13:45        


 


12/3/24 08:00 98.1       





 98.1       














                           Total Intake and Output   


 


 12/2/24 12/2/24 12/3/24





 15:00 23:00 07:00


 


Intake Total 338 ml 488 ml 288 ml


 


Output Total  350 ml 250 ml


 


Balance 338 ml 138 ml 38 ml








medications





                               Current Medications








 Medications  Dose


 Ordered  Sig/Brian


 Route  Start Time


 Stop Time Status Last Admin


Dose Admin


 


 Dextrose  50 ml  UD  PRN


 IV  11/26/24 18:15


     





 


 Aspirin  81 mg  DAILY


 PO  11/27/24 10:00


    12/2/24 10:06


81 MG


 


 Atorvastatin


 Calcium  40 mg  HS


 PO  11/26/24 22:00


    12/2/24 22:07


40 MG


 


 Morphine Sulfate  2 mg  Q30MP  PRN


 IV  11/26/24 18:15


     





 


 Acetaminophen  650 mg  Q6HP  PRN


 PO  11/26/24 18:15


    11/30/24 17:22


650 MG


 


 Docusate Sodium  100 mg  DAILY


 PO  11/27/24 10:00


    12/1/24 09:37


100 MG


 


 Ondansetron HCl  4 mg  Q4HP  PRN


 IV  11/26/24 18:15


     





 


 Nitroglycerin  0.4 mg  Q5MINP  PRN


 SL  11/26/24 18:15


     





 


 Albuterol  2.5 mg  Q4HR


 NEB  11/26/24 22:00


    12/3/24 14:11


2.5 MG


 


 Ceftriaxone Sodium  50 ml @ 


 100 mls/hr  DAILY@09


 IV  11/27/24 09:00


    12/3/24 07:55


100 MLS/HR


 


 Azithromycin  250 ml @ 


 125 mls/hr  DAILY


 IV  11/27/24 10:00


    12/3/24 09:55


125 MLS/HR


 


 Heparin Sodium/


 Dextrose  250 ml @ 


 11 mls/hr  [id number]


 IV  11/29/24 08:30


    12/3/24 01:52


11 MLS/HR


 


 Diagnostic Test


  (Pha)  1 strip  ACHS


 VI  11/29/24 17:00


    12/3/24 06:34


1 STRIP


 


 Melatonin  5 mg  HS  PRN


 PO  11/29/24 22:00


    12/2/24 22:07


5 MG


 


 Insulin Human


 Regular    ACHS


 SC  11/29/24 22:00


    12/2/24 11:38


2 UNITS








Examination:  GENERAL:Normal, HEENT:Normal, NECK:Normal, LUNGS:Normal, 

LUNGS:Abnormal (ON OXYMIZER), CVS:Normal, ABDOMEN:Normal, MSK:Normal, 

SKIN:Normal, NEURO:Normal, :Normal


laboratory and microbiology


                                Laboratory Tests


12/3/24 04:58

















Test


 12/3/24


04:58 Range/Units


 


 


Serum Glucose 92    mg/dL








                                  Microbiology








 Date/Time


Source Procedure


Growth Status





 


 11/30/24 00:00


Nose MRSA Screen - Final


 Complete











Problem List/Assessment/Plan


Problem List/Assessment/Plan


#1 acute resp failure: cont high flow oxygen


#2 acute PE: on heparin drip, s/p thrombectomy today


#3 asthma


#4 htn


#5 obesity


#6 nstemi ?type 2


#7 acute diastolic heart failure likely due to pe/rv strain


#8 ?pneumonia: on antibiotics


#9 acute renal failure ?vasomotor nephropathy


Plan discussed with:  Patient


Critical Care Time (mins):  41 (critical care time 41 mins)





Date of Service:  Dec 3, 2024


Billing Provider:  JIMI GRACE MD


Common Visit Codes:  [id number] CARE 30-74 MIN











JIMI GRACE MD          Dec 3, 2024 15:13

## 2024-12-04 VITALS — HEART RATE: 91 BPM | RESPIRATION RATE: 19 BRPM | OXYGEN SATURATION: 95 %

## 2024-12-04 VITALS — RESPIRATION RATE: 18 BRPM | OXYGEN SATURATION: 96 % | HEART RATE: 91 BPM

## 2024-12-04 VITALS — RESPIRATION RATE: 20 BRPM | HEART RATE: 79 BPM | OXYGEN SATURATION: 90 %

## 2024-12-04 VITALS — HEART RATE: 87 BPM | OXYGEN SATURATION: 89 % | RESPIRATION RATE: 16 BRPM

## 2024-12-04 VITALS
HEART RATE: 79 BPM | OXYGEN SATURATION: 94 % | SYSTOLIC BLOOD PRESSURE: 103 MMHG | RESPIRATION RATE: 18 BRPM | TEMPERATURE: 98.5 F | DIASTOLIC BLOOD PRESSURE: 70 MMHG

## 2024-12-04 VITALS — OXYGEN SATURATION: 96 % | HEART RATE: 89 BPM | RESPIRATION RATE: 20 BRPM

## 2024-12-04 VITALS — RESPIRATION RATE: 20 BRPM | OXYGEN SATURATION: 95 % | HEART RATE: 95 BPM

## 2024-12-04 VITALS
HEART RATE: 89 BPM | SYSTOLIC BLOOD PRESSURE: 101 MMHG | RESPIRATION RATE: 19 BRPM | DIASTOLIC BLOOD PRESSURE: 62 MMHG | OXYGEN SATURATION: 93 %

## 2024-12-04 VITALS
DIASTOLIC BLOOD PRESSURE: 68 MMHG | RESPIRATION RATE: 12 BRPM | SYSTOLIC BLOOD PRESSURE: 143 MMHG | HEART RATE: 91 BPM | OXYGEN SATURATION: 95 %

## 2024-12-04 VITALS
RESPIRATION RATE: 15 BRPM | DIASTOLIC BLOOD PRESSURE: 65 MMHG | HEART RATE: 66 BPM | OXYGEN SATURATION: 95 % | SYSTOLIC BLOOD PRESSURE: 120 MMHG

## 2024-12-04 VITALS — RESPIRATION RATE: 17 BRPM | HEART RATE: 80 BPM | OXYGEN SATURATION: 94 %

## 2024-12-04 VITALS
DIASTOLIC BLOOD PRESSURE: 59 MMHG | SYSTOLIC BLOOD PRESSURE: 120 MMHG | OXYGEN SATURATION: 94 % | RESPIRATION RATE: 12 BRPM | HEART RATE: 78 BPM

## 2024-12-04 VITALS
SYSTOLIC BLOOD PRESSURE: 98 MMHG | OXYGEN SATURATION: 95 % | DIASTOLIC BLOOD PRESSURE: 73 MMHG | RESPIRATION RATE: 15 BRPM | HEART RATE: 85 BPM

## 2024-12-04 VITALS — OXYGEN SATURATION: 93 % | RESPIRATION RATE: 16 BRPM | HEART RATE: 86 BPM

## 2024-12-04 VITALS
OXYGEN SATURATION: 97 % | RESPIRATION RATE: 16 BRPM | DIASTOLIC BLOOD PRESSURE: 70 MMHG | TEMPERATURE: 98.2 F | HEART RATE: 87 BPM | SYSTOLIC BLOOD PRESSURE: 123 MMHG

## 2024-12-04 VITALS — HEART RATE: 82 BPM | RESPIRATION RATE: 20 BRPM | OXYGEN SATURATION: 99 %

## 2024-12-04 VITALS
HEART RATE: 90 BPM | RESPIRATION RATE: 16 BRPM | OXYGEN SATURATION: 94 % | SYSTOLIC BLOOD PRESSURE: 116 MMHG | DIASTOLIC BLOOD PRESSURE: 54 MMHG

## 2024-12-04 VITALS
SYSTOLIC BLOOD PRESSURE: 108 MMHG | RESPIRATION RATE: 15 BRPM | OXYGEN SATURATION: 92 % | DIASTOLIC BLOOD PRESSURE: 64 MMHG | HEART RATE: 75 BPM

## 2024-12-04 VITALS — HEART RATE: 88 BPM | OXYGEN SATURATION: 95 % | RESPIRATION RATE: 16 BRPM

## 2024-12-04 VITALS
RESPIRATION RATE: 17 BRPM | OXYGEN SATURATION: 97 % | DIASTOLIC BLOOD PRESSURE: 74 MMHG | TEMPERATURE: 98 F | HEART RATE: 87 BPM | SYSTOLIC BLOOD PRESSURE: 119 MMHG

## 2024-12-04 VITALS — RESPIRATION RATE: 19 BRPM | HEART RATE: 96 BPM | OXYGEN SATURATION: 93 %

## 2024-12-04 VITALS — HEART RATE: 90 BPM | OXYGEN SATURATION: 92 % | RESPIRATION RATE: 11 BRPM

## 2024-12-04 VITALS
HEART RATE: 76 BPM | SYSTOLIC BLOOD PRESSURE: 106 MMHG | DIASTOLIC BLOOD PRESSURE: 61 MMHG | TEMPERATURE: 98.6 F | RESPIRATION RATE: 11 BRPM | OXYGEN SATURATION: 95 %

## 2024-12-04 VITALS — RESPIRATION RATE: 16 BRPM | HEART RATE: 82 BPM | OXYGEN SATURATION: 95 %

## 2024-12-04 VITALS — HEART RATE: 89 BPM | OXYGEN SATURATION: 93 % | RESPIRATION RATE: 18 BRPM

## 2024-12-04 VITALS — HEART RATE: 80 BPM | OXYGEN SATURATION: 96 % | RESPIRATION RATE: 18 BRPM

## 2024-12-04 VITALS — OXYGEN SATURATION: 90 % | HEART RATE: 77 BPM | RESPIRATION RATE: 20 BRPM

## 2024-12-04 VITALS — HEART RATE: 83 BPM | RESPIRATION RATE: 17 BRPM | OXYGEN SATURATION: 92 %

## 2024-12-04 VITALS — RESPIRATION RATE: 17 BRPM | OXYGEN SATURATION: 95 % | HEART RATE: 70 BPM

## 2024-12-04 VITALS — HEART RATE: 84 BPM | RESPIRATION RATE: 17 BRPM | OXYGEN SATURATION: 91 %

## 2024-12-04 VITALS
DIASTOLIC BLOOD PRESSURE: 62 MMHG | OXYGEN SATURATION: 90 % | HEART RATE: 91 BPM | RESPIRATION RATE: 20 BRPM | SYSTOLIC BLOOD PRESSURE: 116 MMHG

## 2024-12-04 VITALS
RESPIRATION RATE: 20 BRPM | TEMPERATURE: 98.3 F | OXYGEN SATURATION: 94 % | HEART RATE: 88 BPM | SYSTOLIC BLOOD PRESSURE: 120 MMHG | DIASTOLIC BLOOD PRESSURE: 61 MMHG

## 2024-12-04 VITALS — HEART RATE: 74 BPM | OXYGEN SATURATION: 96 % | RESPIRATION RATE: 18 BRPM

## 2024-12-04 VITALS
OXYGEN SATURATION: 95 % | SYSTOLIC BLOOD PRESSURE: 134 MMHG | HEART RATE: 82 BPM | DIASTOLIC BLOOD PRESSURE: 73 MMHG | RESPIRATION RATE: 16 BRPM

## 2024-12-04 VITALS
OXYGEN SATURATION: 96 % | RESPIRATION RATE: 15 BRPM | HEART RATE: 68 BPM | DIASTOLIC BLOOD PRESSURE: 65 MMHG | SYSTOLIC BLOOD PRESSURE: 100 MMHG

## 2024-12-04 VITALS — HEART RATE: 97 BPM | RESPIRATION RATE: 19 BRPM | OXYGEN SATURATION: 94 %

## 2024-12-04 VITALS — OXYGEN SATURATION: 92 % | RESPIRATION RATE: 20 BRPM | HEART RATE: 94 BPM

## 2024-12-04 VITALS — HEART RATE: 81 BPM | OXYGEN SATURATION: 94 % | RESPIRATION RATE: 18 BRPM

## 2024-12-04 VITALS — OXYGEN SATURATION: 91 % | HEART RATE: 89 BPM | RESPIRATION RATE: 17 BRPM

## 2024-12-04 VITALS — HEART RATE: 83 BPM | RESPIRATION RATE: 15 BRPM | OXYGEN SATURATION: 95 %

## 2024-12-04 VITALS — OXYGEN SATURATION: 96 % | RESPIRATION RATE: 20 BRPM | HEART RATE: 89 BPM

## 2024-12-04 VITALS — OXYGEN SATURATION: 95 % | RESPIRATION RATE: 16 BRPM | HEART RATE: 92 BPM

## 2024-12-04 LAB
ANION GAP SERPL CALCULATED.3IONS-SCNC: 6 MMOL/L (ref 5–15)
APTT PPP: 77.7 SEC (ref 24.5–34.5)
BUN SERPL-MCNC: 20 MG/DL (ref 9–23)
BUN/CREAT SERPL: 24.1 (ref 10–20)
CALCIUM SERPL-MCNC: 9.6 MG/DL (ref 8.7–10.4)
CHLORIDE SERPL-SCNC: 105 MMOL/L (ref 98–107)
CO2 SERPL-SCNC: 31 MMOL/L (ref 20–31)
GLUCOSE SERPL-MCNC: 95 MG/DL (ref 74–106)
HCT VFR BLD AUTO: 41.6 % (ref 36–46)
HGB BLD-MCNC: 14.1 G/DL (ref 12.2–16.2)
INR PPP: 1.05 (ref 0.9–1.15)
MCH RBC QN AUTO: 34.4 PG (ref 28–32)
MCV RBC AUTO: 101.3 FL (ref 80–100)
NRBC BLD QL AUTO: 0 %
POTASSIUM SERPL-SCNC: 4 MMOL/L (ref 3.5–5.1)
PROTHROMBIN TIME: 11.1 SEC (ref 9.3–11.8)
SODIUM SERPL-SCNC: 142 MMOL/L (ref 136–145)

## 2024-12-04 RX ADMIN — HEPARIN SODIUM AND DEXTROSE SCH MLS/HR: 10000; 5 INJECTION INTRAVENOUS at 06:45

## 2024-12-04 RX ADMIN — APIXABAN SCH MG: 5 TABLET, FILM COATED ORAL at 21:31

## 2024-12-04 NOTE — DVHPN2
Progress Note


Date Seen:  Dec 4, 2024


Medical Necessity Reason


Pt with a Central, PICC or Fol:  No





Subjective


Patient reports:  No new complaints


Review of Systems:  HEENT:Normal, CVS:Normal, RESPIRATORY:Normal, GI:Normal, 

:Normal, MSK:Normal, NEURO:Normal





Objective


vital signs





                                   Vital Sign








  Date Time  Temp Pulse Resp B/P (MAP) Pulse Ox O2 Delivery O2 Flow Rate FiO2


 


12/4/24 09:57  89 20  96   


 


12/4/24 09:49      Oxymizer 7.0 


 


12/4/24 09:49        N/A


 


12/4/24 06:00    98/73 (81)    


 


12/4/24 04:00 98.6       





 98.6       














                           Total Intake and Output   


 


 12/3/24 12/3/24 12/4/24





 15:00 23:00 07:00


 


Intake Total 388 ml 266 ml 315 ml


 


Output Total  450 ml 275 ml


 


Balance 388 ml -184 ml 40 ml








medications





                               Current Medications








 Medications  Dose


 Ordered  Sig/Brian


 Route  Start Time


 Stop Time Status Last Admin


Dose Admin


 


 Dextrose  50 ml  UD  PRN


 IV  11/26/24 18:15


     





 


 Aspirin  81 mg  DAILY


 PO  11/27/24 10:00


    12/2/24 10:06


81 MG


 


 Atorvastatin


 Calcium  40 mg  HS


 PO  11/26/24 22:00


    12/3/24 21:44


40 MG


 


 Morphine Sulfate  2 mg  Q30MP  PRN


 IV  11/26/24 18:15


     





 


 Acetaminophen  650 mg  Q6HP  PRN


 PO  11/26/24 18:15


    12/3/24 21:44


650 MG


 


 Docusate Sodium  100 mg  DAILY


 PO  11/27/24 10:00


    12/1/24 09:37


100 MG


 


 Ondansetron HCl  4 mg  Q4HP  PRN


 IV  11/26/24 18:15


     





 


 Nitroglycerin  0.4 mg  Q5MINP  PRN


 SL  11/26/24 18:15


     





 


 Albuterol  2.5 mg  Q4HR


 NEB  11/26/24 22:00


    12/4/24 09:46


2.5 MG


 


 Ceftriaxone Sodium  50 ml @ 


 100 mls/hr  DAILY@09


 IV  11/27/24 09:00


    12/4/24 08:31


100 MLS/HR


 


 Diagnostic Test


  (Pha)  1 strip  ACHS


 VI  11/29/24 17:00


    12/4/24 06:20


1 STRIP


 


 Melatonin  5 mg  HS  PRN


 PO  11/29/24 22:00


    12/3/24 21:44


5 MG


 


 Insulin Human


 Regular    ACHS


 SC  11/29/24 22:00


    12/3/24 17:00


3 UNITS








Examination:  GENERAL:Normal, HEENT:Normal, NECK:Normal, LUNGS:Normal, 

LUNGS:Abnormal (on oxymizer), CVS:Normal, ABDOMEN:Normal, MSK:Normal, 

SKIN:Normal, NEURO:Normal, :Normal


laboratory and microbiology


                                Laboratory Tests


12/4/24 04:49

















Test


 12/4/24


04:49 Range/Units


 


 


Serum Glucose 95    mg/dL








                                  Microbiology








 Date/Time


Source Procedure


Growth Status





 


 11/30/24 00:00


Nose MRSA Screen - Final


 Complete











Problem List/Assessment/Plan


Problem List/Assessment/Plan


#1 acute resp failure: on oxymizer


#2 acute PE: on heparin drip, s/p thrombectomy , start eliquis


#3 asthma


#4 htn


#5 obesity


#6 nstemi ?type 2


#7 acute diastolic heart failure likely due to pe/rv strain


#8 ?pneumonia: on antibiotics


#9 acute renal failure ?vasomotor nephropathy


Plan discussed with:  Patient





My Orders


My Orders





                         Orders - JIMI GRACE MD








Procedure Category Date Status





  Time 


 


Apixaban (Eliquis) PHA 12/4/24 Transmitted





  22:00 


 


Apixaban (Eliquis) PHA 12/4/24 Transmitted





  22:00 


 


Azithromycin Tablet PHA 12/5/24 Transmitted





 (Zithromax Tablet)  10:00 


 


Discontinue Weiner DAO 12/4/24 Transmitted





Catheter  10:01 


 


Transfer Orders XFER 12/4/24 Transmitted





  10:01 


 


Pt Request For Service PT 12/4/24 Transmitted





  10:01 











Dietary Evaluation Review


Comments:  


Continue current plan of care


Expected Outcomes/Goals:  


F/U in 3-5 days


Critical Care Time (mins):  39 (critical care time 39 mins)





Date of Service:  Dec 4, 2024


Billing Provider:  JIMI GRACE MD


Common Visit Codes:  51739-[id number] CARE 30-74 MIN











JIMI GRACE MD          Dec 4, 2024 10:05

## 2024-12-04 NOTE — DVHPN2
Consult Progress Note


Subjective


Other Systems:  


Patient now on telemetry unit.


Patient on 6L oxymizer at time of assessment. 


Flowstasis removed from right groin, no signs of bleeding or hematoma


noted.





Objective


vital signs





                                   Vital Sign








  Date Time  Temp Pulse Resp B/P (MAP) Pulse Ox O2 Delivery O2 Flow Rate FiO2


 


12/4/24 12:15  83 15  95   


 


12/4/24 12:00      Oxymizer 8 N/A


 


12/4/24 04:00 98.6       





 98.6       














                           Total Intake and Output   


 


 12/3/24 12/3/24 12/4/24





 15:00 23:00 07:00


 


Intake Total 388 ml 266 ml 324 ml


 


Output Total  450 ml 275 ml


 


Balance 388 ml -184 ml 49 ml








medications





                               Current Medications








 Medications  Dose


 Ordered  Sig/Brian


 Route  Start Time


 Stop Time Status Last Admin


Dose Admin


 


 Dextrose  50 ml  UD  PRN


 IV  11/26/24 18:15


     





 


 Aspirin  81 mg  DAILY


 PO  11/27/24 10:00


    12/4/24 11:07


81 MG


 


 Atorvastatin


 Calcium  40 mg  HS


 PO  11/26/24 22:00


    12/3/24 21:44


40 MG


 


 Morphine Sulfate  2 mg  Q30MP  PRN


 IV  11/26/24 18:15


     





 


 Acetaminophen  650 mg  Q6HP  PRN


 PO  11/26/24 18:15


    12/3/24 21:44


650 MG


 


 Docusate Sodium  100 mg  DAILY


 PO  11/27/24 10:00


    12/1/24 09:37


100 MG


 


 Ondansetron HCl  4 mg  Q4HP  PRN


 IV  11/26/24 18:15


     





 


 Nitroglycerin  0.4 mg  Q5MINP  PRN


 SL  11/26/24 18:15


     





 


 Albuterol  2.5 mg  Q4HR


 NEB  11/26/24 22:00


    12/4/24 09:46


2.5 MG


 


 Ceftriaxone Sodium  50 ml @ 


 100 mls/hr  DAILY@09


 IV  11/27/24 09:00


    12/4/24 08:31


100 MLS/HR


 


 Diagnostic Test


  (Pha)  1 strip  ACHS


 VI  11/29/24 17:00


    12/4/24 11:07


1 STRIP


 


 Melatonin  5 mg  HS  PRN


 PO  11/29/24 22:00


    12/3/24 21:44


5 MG


 


 Insulin Human


 Regular    ACHS


 SC  11/29/24 22:00


    12/3/24 17:00


3 UNITS


 


 Apixaban  10 mg  BID


 PO  12/4/24 22:00


 12/11/24 21:59   





 


 Apixaban  5 mg  BID


 PO  12/11/24 22:00


     





 


 Azithromycin  500 mg  DAILY


 PO  12/5/24 10:00


     











Examination:  GENERAL:Normal, LUNGS:Abnormal (Diminished bilateral lower lobes 

), CVS:Normal, NEURO:Normal


laboratory and microbiology


                                Laboratory Tests


12/4/24 04:49

















Test


 12/4/24


04:49 Range/Units


 


 


Serum Glucose 95    mg/dL











Problem List/Assessment/Plan


Problem List/Assessment/Plan


Bilateral pulmonary emboli with evidence of right heart strain s/p mechanical 

thrombectomy


NSTEMI type II secondary to above


Mild-to-moderate tricuspid valve regurgitation


Acute on chronic hypoxic respiratory failure


Hx of PE in 2009, off Xarelto x 1 mo.


Left lung pneumonia 


Asthma exacerbation


Acute kidney injury


Suboptimal medical therapy


Medication noncompliance








Plan/Recommendation


(Dr. Sanchez)





Transthoracic echocardiogram reveals EF 55%, RVSP 65 mmHg.  A CT angio chest 

with contrast revealed bilateral pulmonary emboli with evidence of right heart 

strain.  The patient underwent a mechanical thrombectomy on 12/03/2024 in which 

there was a successful attempt for clot retrieval of the right distal pulmonary 

artery as well as the right interlobular distal artery with minimal clot 

retrieval and improvement in the pulmonary artery pressure.  Today, the patient 

is on 6L Oxymizer and states improvement in breathing.  We will recommend long-

term anticoagulation and for the patient to follow up with a pulmonary 

hypertension clinic in the outpatient setting.  There is no further inpatient 

cardiac workup indicated at this time.  Cardiology will sign off.  Please 

reconsult if needed.  Thank you for allowing us to care for this patient. Please

call with any questions or concerns.





This medical document was created using an electronic medical record system with

voice recognition software and computerized dictation system.  Although this 

document has been carefully reviewed, there might still be some phonetic and 

typographical errors.  Occasional wrong-word or ``sound-alike substitutions 

may have occurred due to the inherent limitations of voice recognition software.

 These areas are purely typographical due to imperfections of the software 

programs and do not reflect any compromise in the patient's medical care.  

Please read the chart carefully and recognize, using context, where these 

substitutions have occurred.


Plan discussed with:  Patient





Dietary Evaluation Review


Comments:  


Continue current plan of care


Expected Outcomes/Goals:  


F/U in 3-5 days





Date of Service:  Dec 4, 2024


Billing Provider:  YO SANCHEZ MD


Common Visit Codes:  [id number] INP/OBS CARE(HIGH)











DOC ALVARADO [facility]              Dec 4, 2024 13:33

## 2024-12-05 VITALS
HEART RATE: 77 BPM | DIASTOLIC BLOOD PRESSURE: 64 MMHG | SYSTOLIC BLOOD PRESSURE: 109 MMHG | TEMPERATURE: 98 F | OXYGEN SATURATION: 94 % | RESPIRATION RATE: 18 BRPM

## 2024-12-05 VITALS — HEART RATE: 84 BPM | RESPIRATION RATE: 18 BRPM | OXYGEN SATURATION: 99 %

## 2024-12-05 VITALS
DIASTOLIC BLOOD PRESSURE: 73 MMHG | TEMPERATURE: 97.5 F | SYSTOLIC BLOOD PRESSURE: 114 MMHG | HEART RATE: 65 BPM | OXYGEN SATURATION: 94 % | RESPIRATION RATE: 18 BRPM

## 2024-12-05 VITALS
RESPIRATION RATE: 18 BRPM | OXYGEN SATURATION: 94 % | HEART RATE: 84 BPM | SYSTOLIC BLOOD PRESSURE: 104 MMHG | DIASTOLIC BLOOD PRESSURE: 68 MMHG

## 2024-12-05 VITALS — HEART RATE: 69 BPM

## 2024-12-05 VITALS
TEMPERATURE: 98.5 F | RESPIRATION RATE: 21 BRPM | OXYGEN SATURATION: 94 % | DIASTOLIC BLOOD PRESSURE: 68 MMHG | HEART RATE: 89 BPM | SYSTOLIC BLOOD PRESSURE: 104 MMHG

## 2024-12-05 VITALS — RESPIRATION RATE: 20 BRPM | OXYGEN SATURATION: 85 % | HEART RATE: 98 BPM

## 2024-12-05 VITALS
SYSTOLIC BLOOD PRESSURE: 113 MMHG | RESPIRATION RATE: 18 BRPM | TEMPERATURE: 98.2 F | HEART RATE: 85 BPM | DIASTOLIC BLOOD PRESSURE: 65 MMHG | OXYGEN SATURATION: 93 %

## 2024-12-05 VITALS
SYSTOLIC BLOOD PRESSURE: 120 MMHG | RESPIRATION RATE: 17 BRPM | OXYGEN SATURATION: 97 % | HEART RATE: 84 BPM | TEMPERATURE: 98 F | DIASTOLIC BLOOD PRESSURE: 65 MMHG

## 2024-12-05 VITALS
SYSTOLIC BLOOD PRESSURE: 111 MMHG | HEART RATE: 82 BPM | OXYGEN SATURATION: 96 % | DIASTOLIC BLOOD PRESSURE: 69 MMHG | RESPIRATION RATE: 20 BRPM | TEMPERATURE: 97.6 F

## 2024-12-05 VITALS — HEART RATE: 94 BPM | OXYGEN SATURATION: 88 % | RESPIRATION RATE: 20 BRPM

## 2024-12-05 VITALS — RESPIRATION RATE: 18 BRPM | OXYGEN SATURATION: 98 % | HEART RATE: 81 BPM

## 2024-12-05 VITALS — RESPIRATION RATE: 20 BRPM | OXYGEN SATURATION: 98 % | HEART RATE: 75 BPM

## 2024-12-05 VITALS — HEART RATE: 74 BPM | RESPIRATION RATE: 18 BRPM

## 2024-12-05 VITALS — RESPIRATION RATE: 18 BRPM | HEART RATE: 95 BPM

## 2024-12-05 VITALS — OXYGEN SATURATION: 87 %

## 2024-12-05 VITALS — RESPIRATION RATE: 16 BRPM | OXYGEN SATURATION: 93 % | HEART RATE: 81 BPM

## 2024-12-05 VITALS — HEART RATE: 90 BPM

## 2024-12-05 VITALS — OXYGEN SATURATION: 95 %

## 2024-12-05 VITALS — OXYGEN SATURATION: 96 %

## 2024-12-05 VITALS — OXYGEN SATURATION: 100 % | HEART RATE: 82 BPM | RESPIRATION RATE: 18 BRPM

## 2024-12-05 VITALS — RESPIRATION RATE: 18 BRPM | HEART RATE: 73 BPM

## 2024-12-05 LAB
HCT VFR BLD AUTO: 39.4 % (ref 36–46)
HGB BLD-MCNC: 13.3 G/DL (ref 12.2–16.2)
MCH RBC QN AUTO: 34.3 PG (ref 28–32)
MCV RBC AUTO: 101.5 FL (ref 80–100)
NRBC BLD QL AUTO: 0 %

## 2024-12-05 RX ADMIN — AZITHROMYCIN MONOHYDRATE SCH MG: 250 TABLET ORAL at 08:44

## 2024-12-05 NOTE — DVHPN2
Progress Note


Date Seen:  Dec 5, 2024


Medical Necessity Reason


Pt with a Central, PICC or Fol:  No





Subjective


Patient reports:  No new complaints


Review of Systems:  HEENT:Normal, CVS:Normal, RESPIRATORY:Normal, GI:Normal, 

:Normal, MSK:Normal, NEURO:Normal





Objective


vital signs





                                   Vital Sign








  Date Time  Temp Pulse Resp B/P (MAP) Pulse Ox O2 Delivery O2 Flow Rate FiO2


 


12/5/24 08:42  73 18     


 


12/5/24 08:34 98.0   120/65 (83) 97   





 98.0       


 


12/5/24 08:29      Oxymizer 6.0 


 


12/5/24 08:29        N/A














                           Total Intake and Output   


 


 12/4/24 12/4/24 12/5/24





 15:00 23:00 07:00


 


Intake Total 77 ml 600 ml 


 


Balance 77 ml 600 ml 








medications





                               Current Medications








 Medications  Dose


 Ordered  Sig/Brian


 Route  Start Time


 Stop Time Status Last Admin


Dose Admin


 


 Dextrose  50 ml  UD  PRN


 IV  11/26/24 18:15


     





 


 Aspirin  81 mg  DAILY


 PO  11/27/24 10:00


    12/5/24 08:44


81 MG


 


 Atorvastatin


 Calcium  40 mg  HS


 PO  11/26/24 22:00


    12/4/24 21:30


40 MG


 


 Morphine Sulfate  2 mg  Q30MP  PRN


 IV  11/26/24 18:15


     





 


 Acetaminophen  650 mg  Q6HP  PRN


 PO  11/26/24 18:15


    12/4/24 19:39


650 MG


 


 Docusate Sodium  100 mg  DAILY


 PO  11/27/24 10:00


    12/5/24 08:57


100 MG


 


 Ondansetron HCl  4 mg  Q4HP  PRN


 IV  11/26/24 18:15


     





 


 Nitroglycerin  0.4 mg  Q5MINP  PRN


 SL  11/26/24 18:15


     





 


 Albuterol  2.5 mg  Q4HR


 NEB  11/26/24 22:00


    12/5/24 08:28


2.5 MG


 


 Ceftriaxone Sodium  50 ml @ 


 100 mls/hr  DAILY@09


 IV  11/27/24 09:00


    12/5/24 08:43


100 MLS/HR


 


 Diagnostic Test


  (Pha)  1 strip  ACHS


 VI  11/29/24 17:00


    12/5/24 06:20


1 STRIP


 


 Melatonin  5 mg  HS  PRN


 PO  11/29/24 22:00


    12/4/24 23:26


5 MG


 


 Insulin Human


 Regular    ACHS


 SC  11/29/24 22:00


    12/3/24 17:00


3 UNITS


 


 Apixaban  10 mg  BID


 PO  12/4/24 22:00


 12/11/24 21:59  12/5/24 08:43


10 MG


 


 Apixaban  5 mg  BID


 PO  12/11/24 22:00


     





 


 Azithromycin  500 mg  DAILY


 PO  12/5/24 10:00


    12/5/24 08:44


500 MG








Examination:  GENERAL:Normal, HEENT:Normal, NECK:Normal, LUNGS:Normal, 

LUNGS:Abnormal (on oxygen), CVS:Normal, ABDOMEN:Normal, MSK:Normal, SKIN:Normal,

NEURO:Normal, :Normal


laboratory and microbiology


                                Laboratory Tests


12/5/24 04:44








12/4/24 04:49

















Test


 12/4/24


04:49 Range/Units


 


 


Serum Glucose 95    mg/dL








                                  Microbiology








 Date/Time


Source Procedure


Growth Status





 


 11/30/24 00:00


Nose MRSA Screen - Final


 Complete











Problem List/Assessment/Plan


Problem List/Assessment/Plan


#1 acute resp failure: on oxymizer


#2 acute PE: on heparin drip, s/p thrombectomy , start eliquis


#3 asthma


#4 htn


#5 obesity


#6 nstemi ?type 2


#7 acute diastolic heart failure likely due to pe/rv strain


#8 ?pneumonia: on antibiotics


#9 acute renal failure ?vasomotor nephropathy





advance care planning- full code- time spent 19mins


Plan discussed with:  Patient





My Orders


My Orders





                         Orders - JIMI GRACE MD








Procedure Category Date Status





  Time 


 


Apixaban (Eliquis) PHA 12/4/24 In Process





  22:00 


 


Azithromycin Tablet PHA 12/5/24 In Process





 (Zithromax Tablet)  10:00 


 


Discontinue Weiner DAO 12/4/24 In Process





Catheter  10:01 


 


Transfer Orders XFER 12/4/24 Transmitted





  10:01 


 


Pt Request For Service PT 12/4/24 Logged





  10:01 


 


Apixaban (Eliquis) PHA 12/11/24 In Process





  22:00 











Dietary Evaluation Review


Comments:  


Continue current plan of care


Expected Outcomes/Goals:  


F/U in 3-5 days





Date of Service:  Dec 5, 2024


Billing Provider:  JIMI GRACE MD


Common Visit Codes:  [id number] INP/OBS CARE(HIGH)


Secondary Visit Codes:  99497-ADVANCED CARE PLAN 30 MINUTES











JIMI GRACE MD          Dec 5, 2024 10:01

## 2024-12-06 VITALS
OXYGEN SATURATION: 94 % | RESPIRATION RATE: 18 BRPM | HEART RATE: 78 BPM | DIASTOLIC BLOOD PRESSURE: 67 MMHG | SYSTOLIC BLOOD PRESSURE: 114 MMHG | TEMPERATURE: 98 F

## 2024-12-06 VITALS — RESPIRATION RATE: 18 BRPM | OXYGEN SATURATION: 99 % | HEART RATE: 77 BPM

## 2024-12-06 VITALS
HEART RATE: 89 BPM | OXYGEN SATURATION: 92 % | RESPIRATION RATE: 18 BRPM | TEMPERATURE: 98.1 F | SYSTOLIC BLOOD PRESSURE: 122 MMHG | DIASTOLIC BLOOD PRESSURE: 71 MMHG

## 2024-12-06 VITALS — RESPIRATION RATE: 18 BRPM | OXYGEN SATURATION: 100 % | HEART RATE: 69 BPM

## 2024-12-06 VITALS
SYSTOLIC BLOOD PRESSURE: 129 MMHG | RESPIRATION RATE: 20 BRPM | TEMPERATURE: 98.1 F | DIASTOLIC BLOOD PRESSURE: 69 MMHG | OXYGEN SATURATION: 92 % | HEART RATE: 88 BPM

## 2024-12-06 VITALS — HEART RATE: 98 BPM

## 2024-12-06 VITALS — HEART RATE: 85 BPM | RESPIRATION RATE: 20 BRPM | OXYGEN SATURATION: 97 %

## 2024-12-06 VITALS
SYSTOLIC BLOOD PRESSURE: 123 MMHG | TEMPERATURE: 98.3 F | OXYGEN SATURATION: 93 % | HEART RATE: 88 BPM | RESPIRATION RATE: 20 BRPM | DIASTOLIC BLOOD PRESSURE: 56 MMHG

## 2024-12-06 VITALS
OXYGEN SATURATION: 96 % | TEMPERATURE: 98.5 F | RESPIRATION RATE: 20 BRPM | DIASTOLIC BLOOD PRESSURE: 70 MMHG | HEART RATE: 85 BPM | SYSTOLIC BLOOD PRESSURE: 107 MMHG

## 2024-12-06 VITALS — RESPIRATION RATE: 18 BRPM | OXYGEN SATURATION: 96 % | HEART RATE: 87 BPM

## 2024-12-06 VITALS — RESPIRATION RATE: 20 BRPM | HEART RATE: 91 BPM | OXYGEN SATURATION: 94 %

## 2024-12-06 VITALS — HEART RATE: 82 BPM | OXYGEN SATURATION: 95 % | RESPIRATION RATE: 18 BRPM

## 2024-12-06 VITALS — RESPIRATION RATE: 18 BRPM | OXYGEN SATURATION: 96 % | HEART RATE: 85 BPM

## 2024-12-06 VITALS — HEART RATE: 85 BPM | RESPIRATION RATE: 20 BRPM | OXYGEN SATURATION: 90 %

## 2024-12-06 VITALS — OXYGEN SATURATION: 99 % | HEART RATE: 82 BPM | RESPIRATION RATE: 18 BRPM

## 2024-12-06 VITALS — HEART RATE: 68 BPM

## 2024-12-06 VITALS — OXYGEN SATURATION: 100 % | RESPIRATION RATE: 18 BRPM | HEART RATE: 89 BPM

## 2024-12-06 VITALS — OXYGEN SATURATION: 95 % | HEART RATE: 88 BPM | RESPIRATION RATE: 20 BRPM

## 2024-12-06 VITALS — OXYGEN SATURATION: 100 % | RESPIRATION RATE: 20 BRPM | HEART RATE: 100 BPM

## 2024-12-06 LAB
ANION GAP SERPL CALCULATED.3IONS-SCNC: 8 MMOL/L (ref 5–15)
BUN SERPL-MCNC: 13 MG/DL (ref 9–23)
BUN/CREAT SERPL: 17.6 (ref 10–20)
CALCIUM SERPL-MCNC: 9.5 MG/DL (ref 8.7–10.4)
CHLORIDE SERPL-SCNC: 106 MMOL/L (ref 98–107)
CO2 SERPL-SCNC: 28 MMOL/L (ref 20–31)
GLUCOSE SERPL-MCNC: 93 MG/DL (ref 74–106)
HCT VFR BLD AUTO: 39.4 % (ref 36–46)
HGB BLD-MCNC: 13.2 G/DL (ref 12.2–16.2)
MCH RBC QN AUTO: 34.6 PG (ref 28–32)
MCV RBC AUTO: 103.2 FL (ref 80–100)
NRBC BLD QL AUTO: 0.1 %
POTASSIUM SERPL-SCNC: 4 MMOL/L (ref 3.5–5.1)
SODIUM SERPL-SCNC: 142 MMOL/L (ref 136–145)

## 2024-12-06 PROCEDURE — 5A09357 ASSISTANCE WITH RESPIRATORY VENTILATION, LESS THAN 24 CONSECUTIVE HOURS, CONTINUOUS POSITIVE AIRWAY PRESSURE: ICD-10-PCS | Performed by: INTERNAL MEDICINE

## 2024-12-06 NOTE — DVHPN2
Subjective


Almost no SOB and no chest tightness


Reviewed:  Care Plan, H&P, Labs, Medications, Previous Orders, Radiology, Other 

(Consultation)


Changes from previous H/P or p:  Changes





Objective


Vitals





Vital Signs








  Date Time  Temp Pulse Resp B/P (MAP) Pulse Ox O2 Delivery O2 Flow Rate FiO2


 


12/6/24 06:25  69 18  100   


 


12/6/24 06:19      Oxymizer 4 N/A


 


12/6/24 05:00 98.1   122/71 (88)    





 98.1       








Intake/Output











                               Intake and Output 


 


 12/6/24





 07:00


 


Intake Total 2450 ml


 


Balance 2450 ml


 


 


 


Intake Oral 2400 ml


 


IV Total 50 ml


 


# Voids 10








General Appearance:  Alert, Oriented X3, Cooperative, No acute distress


HEENT:  Atraumatic


Lungs:  Clear to auscultation, Normal air movement


Cardiovascular:  Regular rate, Normal S1, Normal S2


Abdomen:  Normal bowel sounds, Soft, No tenderness


Neuro:  Normal speech, Cranial nerves 3-12 NL


Psych/Mental Status:  Mental status NL, Mood NL


Medications





                               Current Medications








 Medications  Dose


 Ordered  Sig/Brian


 Route  Start Time


 Stop Time Status Last Admin


Dose Admin


 


 Dextrose  50 ml  UD  PRN


 IV  11/26/24 18:15


     





 


 Aspirin  81 mg  DAILY


 PO  11/27/24 10:00


    12/6/24 10:17


81 MG


 


 Atorvastatin


 Calcium  40 mg  HS


 PO  11/26/24 22:00


    12/5/24 21:37


40 MG


 


 Acetaminophen  650 mg  Q6HP  PRN


 PO  11/26/24 18:15


    12/4/24 19:39


650 MG


 


 Docusate Sodium  100 mg  DAILY


 PO  11/27/24 10:00


    12/1/24 09:37


100 MG


 


 Ondansetron HCl  4 mg  Q4HP  PRN


 IV  11/26/24 18:15


     





 


 Nitroglycerin  0.4 mg  Q5MINP  PRN


 SL  11/26/24 18:15


     





 


 Albuterol  2.5 mg  Q4HR


 NEB  11/26/24 22:00


    12/6/24 06:19


2.5 MG


 


 Ceftriaxone Sodium  50 ml @ 


 100 mls/hr  DAILY@09


 IV  11/27/24 09:00


    12/6/24 10:16


100 MLS/HR


 


 Diagnostic Test


  (Pha)  1 strip  ACHS


 VI  11/29/24 17:00


    12/6/24 06:37


1 STRIP


 


 Melatonin  5 mg  HS  PRN


 PO  11/29/24 22:00


    12/4/24 23:26


5 MG


 


 Insulin Human


 Regular    ACHS


 SC  11/29/24 22:00


    12/5/24 12:16


2 UNITS


 


 Apixaban  10 mg  BID


 PO  12/4/24 22:00


 12/11/24 21:59  12/6/24 10:16


10 MG


 


 Apixaban  5 mg  BID


 PO  12/11/24 22:00


     





 


 Azithromycin  500 mg  DAILY


 PO  12/5/24 10:00


    12/6/24 10:16


500 MG











Laboratory Results


Laboratory Tests


12/6/24 04:39











Chemistry








Test


 12/6/24


04:39


 


Calcium Level


 9.5 mg/dL


(8.7-10.4)








Urinalysis








Test


 12/2/24


10:00


 


Urine Color


 Yellow


(Yellow)


 


Urine Clarity Clear (Clear)  


 


Urine pH 7.0 (5.0-9.0)  


 


Urine Specific Gravity


 1.028


(1.001-1.035)


 


Urine Protein


 2+ (Negative)


H


 


Urine Ketones


 Negative


(Negative)


 


Urine Blood


 3+ /uL


(Negative)  H


 


Urine Nitrite


 Negative


(Negative)


 


Urine Bilirubin


 Negative


(Negative)


 


Urine Urobilinogen


 Normal mg/dL


(Negative)


 


Urine Leukocyte Esterase


 Trace /uL


(Negative)


 


Urine RBC


 997 /hpf (0 -


4)


 


Urine WBC


 14 /hpf (0 -


5)


 


Urine Squamous Epithelial


Cells Few /hpf (<5)  





 


Urine Bacteria


 None seen /hpf


(None Seen)


 


Urine Glucose


 Normal mg/dL


(Normal)








Microbiology





                                  Microbiology








 Date/Time


Source Procedure


Growth Status





 


 11/30/24 00:00


Nose MRSA Screen - Final


 Complete








Labs and/or images reviewed:  Labs reviewed by me, Image(s) reviewed by me





Assessment/Plan


Assessment/Plan


A 64-year-old female patient; with multiple comorbidities; who presented to 

emergency department with SOB and chest tightness.








#Acute hypoxic respiratory failure due to bilateral pulmonary emboli; reviewed 

available imaging studies and ABGs; continue oxygen therapy as needed; now on 

Oxymizer 4 to 6 liters/minutes; was on BiPAP and high-flow nasal cannula; 

continue close monitoring


#Submassive bilateral pulmonary emboli with RV strain in the setting of history 

of pulmonary embolism 2009; causing SOB and chest tightness; stopped Xarelto in 

October 2024; was on heparin infusion; status post thrombectomy (clot retrieval 

of the right distal pulmonary artery as well as the right interlobar distal 

artery) on December 3, 2024 by cardiology; cardiology is following; continue 

apixaban; reviewed chest angiogram and echocardiogram; no DVTs; continue close 

monitoring


#Chest tightness with NSTEMI type 2; demand ischemia; the setting of RV strain 

secondary to submassive bilateral pulmonary emboli; telemetry; continue aspirin 

and statin; cardiology is following; continue close monitoring


#Essential hypertension; continue antihypertensive medications as indicated; 

continue monitoring


#Hyperglycemia; prediabetic; continue insulin sliding scale and hypoglycemia 

protocol; continue monitoring


#Asthma exacerbation; continue IV steroids with IV antibiotics; continue oxygen 

therapy as above; continue monitoring


#Metabolic syndrome with prediabetes and dyslipidemia along with morbid obesity;

counseled the patient on the importance of adopting healthy lifestyle with diet 

and exercise in order to lose weight


#Dyslipidemia; continue statin; reviewed lipid profile; continue monitoring


#Prediabetes; newly diagnosed; hemoglobin A1c of 6.1%; management as above; 

continue monitoring


#GOPAL; most likely vasomotor nephropathy in the setting of submassive bilateral 

pulmonary emboli and RV strain; avoid nephrotoxic agents; continue monitoring


#Macrocytosis without anemia; to investigate as outpatient; continue monitoring


#Low TSH; free T4 WNL; continue monitoring


#Pulmonary hypertension; to follow up as outpatient; continue monitoring


#Physical deconditioning due to critical illness; continue physical therapy as 

inpatient; continue monitoring


#Discharge planning; communication order to wean off oxygen as tolerated was 

placed; 6 minute walking test was placed to determine oxygen needs; new social 

service consult was placed











Goals of care discussed for 20 minutes; full code








Late Entry.





This medical document was created using an electronic medical record system with

computerized dictation system.  Although this document has been carefully 

reviewed, there might still be some phonetic and typographical errors.  These 

areas are purely typographical due to imperfections of the software programs, 

and do not reflect any compromise in the patient's medical care.


Plan discussed with:  Patient, Other (Nurse)


My Orders





                           Orders - ROBERTO RODNEY MD








Procedure Category Date Status





  Time 


 


Basic Metabolic Panel LAB 12/7/24 Verified





  04:00 


 


Complete Blood Count LAB 12/7/24 Verified





  04:00 











Date of Service:  Dec 6, 2024


Billing Provider:  ROBERTO RODNEY MD


Common Visit Codes:  [id number] INP/OBS CARE(HIGH)


Secondary Visit Codes:  99497-ADVANCED CARE PLAN 30 MINUTES (20 minutes)











ROBERTO RODNEY MD              Dec 6, 2024 10:29

## 2024-12-07 VITALS — RESPIRATION RATE: 20 BRPM | OXYGEN SATURATION: 98 % | HEART RATE: 79 BPM

## 2024-12-07 VITALS
HEART RATE: 80 BPM | SYSTOLIC BLOOD PRESSURE: 133 MMHG | DIASTOLIC BLOOD PRESSURE: 83 MMHG | RESPIRATION RATE: 19 BRPM | TEMPERATURE: 98.8 F | OXYGEN SATURATION: 94 %

## 2024-12-07 VITALS
HEART RATE: 90 BPM | RESPIRATION RATE: 17 BRPM | TEMPERATURE: 98 F | DIASTOLIC BLOOD PRESSURE: 76 MMHG | SYSTOLIC BLOOD PRESSURE: 153 MMHG | OXYGEN SATURATION: 90 %

## 2024-12-07 VITALS — OXYGEN SATURATION: 95 % | HEART RATE: 70 BPM | RESPIRATION RATE: 16 BRPM

## 2024-12-07 VITALS
SYSTOLIC BLOOD PRESSURE: 128 MMHG | HEART RATE: 89 BPM | TEMPERATURE: 98 F | OXYGEN SATURATION: 94 % | RESPIRATION RATE: 17 BRPM | DIASTOLIC BLOOD PRESSURE: 65 MMHG

## 2024-12-07 VITALS — RESPIRATION RATE: 15 BRPM | OXYGEN SATURATION: 95 % | HEART RATE: 91 BPM

## 2024-12-07 VITALS — RESPIRATION RATE: 20 BRPM | OXYGEN SATURATION: 92 % | HEART RATE: 83 BPM

## 2024-12-07 VITALS — HEART RATE: 90 BPM | OXYGEN SATURATION: 94 % | RESPIRATION RATE: 14 BRPM

## 2024-12-07 VITALS — HEART RATE: 84 BPM | RESPIRATION RATE: 16 BRPM | OXYGEN SATURATION: 99 %

## 2024-12-07 VITALS — RESPIRATION RATE: 16 BRPM | HEART RATE: 80 BPM | OXYGEN SATURATION: 94 %

## 2024-12-07 VITALS
OXYGEN SATURATION: 91 % | RESPIRATION RATE: 17 BRPM | DIASTOLIC BLOOD PRESSURE: 72 MMHG | TEMPERATURE: 97.5 F | SYSTOLIC BLOOD PRESSURE: 126 MMHG | HEART RATE: 73 BPM

## 2024-12-07 VITALS — RESPIRATION RATE: 16 BRPM | OXYGEN SATURATION: 94 % | HEART RATE: 86 BPM

## 2024-12-07 VITALS
OXYGEN SATURATION: 98 % | HEART RATE: 80 BPM | SYSTOLIC BLOOD PRESSURE: 112 MMHG | RESPIRATION RATE: 18 BRPM | TEMPERATURE: 98.2 F | DIASTOLIC BLOOD PRESSURE: 54 MMHG

## 2024-12-07 VITALS — RESPIRATION RATE: 14 BRPM | OXYGEN SATURATION: 98 % | HEART RATE: 89 BPM

## 2024-12-07 VITALS — OXYGEN SATURATION: 94 % | HEART RATE: 87 BPM | RESPIRATION RATE: 14 BRPM

## 2024-12-07 VITALS — OXYGEN SATURATION: 99 % | HEART RATE: 88 BPM | RESPIRATION RATE: 15 BRPM

## 2024-12-07 VITALS — RESPIRATION RATE: 18 BRPM | OXYGEN SATURATION: 94 % | HEART RATE: 92 BPM

## 2024-12-07 VITALS
SYSTOLIC BLOOD PRESSURE: 137 MMHG | DIASTOLIC BLOOD PRESSURE: 77 MMHG | HEART RATE: 77 BPM | TEMPERATURE: 97.4 F | RESPIRATION RATE: 18 BRPM | OXYGEN SATURATION: 90 %

## 2024-12-07 VITALS — OXYGEN SATURATION: 98 % | RESPIRATION RATE: 15 BRPM | HEART RATE: 78 BPM

## 2024-12-07 LAB
ANION GAP SERPL CALCULATED.3IONS-SCNC: 8 MMOL/L (ref 5–15)
BUN SERPL-MCNC: 12 MG/DL (ref 9–23)
BUN/CREAT SERPL: 15.6 (ref 10–20)
CALCIUM SERPL-MCNC: 9.7 MG/DL (ref 8.7–10.4)
CHLORIDE SERPL-SCNC: 107 MMOL/L (ref 98–107)
CO2 SERPL-SCNC: 26 MMOL/L (ref 20–31)
GLUCOSE SERPL-MCNC: 95 MG/DL (ref 74–106)
HCT VFR BLD AUTO: 38.1 % (ref 36–46)
HGB BLD-MCNC: 13.1 G/DL (ref 12.2–16.2)
MCH RBC QN AUTO: 34.6 PG (ref 28–32)
MCV RBC AUTO: 100.8 FL (ref 80–100)
NRBC BLD QL AUTO: 0 %
POTASSIUM SERPL-SCNC: 4 MMOL/L (ref 3.5–5.1)
SODIUM SERPL-SCNC: 141 MMOL/L (ref 136–145)

## 2024-12-07 PROCEDURE — 5A09357 ASSISTANCE WITH RESPIRATORY VENTILATION, LESS THAN 24 CONSECUTIVE HOURS, CONTINUOUS POSITIVE AIRWAY PRESSURE: ICD-10-PCS | Performed by: INTERNAL MEDICINE

## 2024-12-07 NOTE — DVHPN2
Subjective


Almost no SOB and no chest tightness


Reviewed:  Care Plan, H&P, Labs, Medications, Previous Orders, Radiology, Other 

(Consultation)


Changes from previous H/P or p:  No Changes





Objective


Vitals





Vital Signs








  Date Time  Temp Pulse Resp B/P (MAP) Pulse Ox O2 Delivery O2 Flow Rate FiO2


 


12/7/24 01:53  83 20  92   


 


12/7/24 01:00 97.5   126/72 (90)    





 97.5       


 


12/6/24 20:00      Oxymizer 4 N/A








Intake/Output











                               Intake and Output 


 


 12/7/24





 07:00


 


Intake Total 1000 ml


 


Balance 1000 ml


 


 


 


Intake Oral 950 ml


 


IV Total 50 ml


 


# Voids 3








General Appearance:  Alert, Oriented X3, Cooperative, No acute distress


HEENT:  Atraumatic


Lungs:  Clear to auscultation, Normal air movement


Cardiovascular:  Regular rate, Normal S1, Normal S2


Abdomen:  Normal bowel sounds, Soft, No tenderness


Neuro:  Normal speech, Cranial nerves 3-12 NL


Psych/Mental Status:  Mental status NL, Mood NL


Medications





                               Current Medications








 Medications  Dose


 Ordered  Sig/Brian


 Route  Start Time


 Stop Time Status Last Admin


Dose Admin


 


 Dextrose  50 ml  UD  PRN


 IV  11/26/24 18:15


     





 


 Aspirin  81 mg  DAILY


 PO  11/27/24 10:00


    12/6/24 10:17


81 MG


 


 Atorvastatin


 Calcium  40 mg  HS


 PO  11/26/24 22:00


    12/6/24 21:34


40 MG


 


 Acetaminophen  650 mg  Q6HP  PRN


 PO  11/26/24 18:15


    12/7/24 00:43


650 MG


 


 Docusate Sodium  100 mg  DAILY


 PO  11/27/24 10:00


    12/1/24 09:37


100 MG


 


 Ondansetron HCl  4 mg  Q4HP  PRN


 IV  11/26/24 18:15


     





 


 Nitroglycerin  0.4 mg  Q5MINP  PRN


 SL  11/26/24 18:15


     





 


 Albuterol  2.5 mg  Q4HR


 NEB  11/26/24 22:00


    12/7/24 01:53


2.5 MG


 


 Ceftriaxone Sodium  50 ml @ 


 100 mls/hr  DAILY@09


 IV  11/27/24 09:00


    12/6/24 10:16


100 MLS/HR


 


 Diagnostic Test


  (Pha)  1 strip  ACHS


 VI  11/29/24 17:00


    12/6/24 21:40


1 STRIP


 


 Melatonin  5 mg  HS  PRN


 PO  11/29/24 22:00


    12/4/24 23:26


5 MG


 


 Insulin Human


 Regular    ACHS


 SC  11/29/24 22:00


    12/6/24 16:50


2 UNITS


 


 Apixaban  10 mg  BID


 PO  12/4/24 22:00


 12/11/24 21:59  12/6/24 21:39


10 MG


 


 Apixaban  5 mg  BID


 PO  12/11/24 22:00


     





 


 Azithromycin  500 mg  DAILY


 PO  12/5/24 10:00


    12/6/24 10:16


500 MG











Laboratory Results


Laboratory Tests


12/6/24 04:39











Chemistry








Test


 12/6/24


04:39


 


Calcium Level


 9.5 mg/dL


(8.7-10.4)








Urinalysis








Test


 12/2/24


10:00


 


Urine Color


 Yellow


(Yellow)


 


Urine Clarity Clear (Clear)  


 


Urine pH 7.0 (5.0-9.0)  


 


Urine Specific Gravity


 1.028


(1.001-1.035)


 


Urine Protein


 2+ (Negative)


H


 


Urine Ketones


 Negative


(Negative)


 


Urine Blood


 3+ /uL


(Negative)  H


 


Urine Nitrite


 Negative


(Negative)


 


Urine Bilirubin


 Negative


(Negative)


 


Urine Urobilinogen


 Normal mg/dL


(Negative)


 


Urine Leukocyte Esterase


 Trace /uL


(Negative)


 


Urine RBC


 997 /hpf (0 -


4)


 


Urine WBC


 14 /hpf (0 -


5)


 


Urine Squamous Epithelial


Cells Few /hpf (<5)  





 


Urine Bacteria


 None seen /hpf


(None Seen)


 


Urine Glucose


 Normal mg/dL


(Normal)








Microbiology





                                  Microbiology








 Date/Time


Source Procedure


Growth Status





 


 11/30/24 00:00


Nose MRSA Screen - Final


 Complete








Labs and/or images reviewed:  Labs reviewed by me, Image(s) reviewed by me





Assessment/Plan


Assessment/Plan


A 64-year-old female patient; with multiple comorbidities; who presented to 

emergency department with SOB and chest tightness.








#Acute hypoxic respiratory failure due to bilateral pulmonary emboli; reviewed 

available imaging studies and ABGs; continue oxygen therapy as needed; now on 2 

- 4 liters/minutes via nasal cannula; was on BiPAP, Oxymizer, and high-flow 

nasal cannula; continue monitoring


#Submassive bilateral pulmonary emboli with RV strain in the setting of history 

of pulmonary embolism 2009; causing SOB and chest tightness; stopped Xarelto in 

October 2024; was on heparin infusion; status post thrombectomy (clot retrieval 

of the right distal pulmonary artery as well as the right interlobar distal 

artery) on December 3, 2024 by cardiology; cardiology is following; continue 

apixaban; reviewed chest angiogram and echocardiogram; no DVTs; continue 

monitoring


#Chest tightness with NSTEMI type 2; demand ischemia; the setting of RV strain 

secondary to submassive bilateral pulmonary emboli; telemetry; continue aspirin 

and statin; cardiology is following; continue monitoring


#Essential hypertension; continue antihypertensive medications as indicated; 

continue monitoring


#Hyperglycemia; prediabetic; continue insulin sliding scale and hypoglycemia 

protocol; continue monitoring


#Asthma exacerbation; was IV steroids; continue IV antibiotics; continue oxygen 

therapy as above; continue monitoring


#Metabolic syndrome with prediabetes and dyslipidemia along with morbid obesity;

counseled the patient on the importance of adopting healthy lifestyle with diet 

and exercise in order to lose weight; continue monitoring


#Morbid obesity; details and management as above; continue monitoring


#Dyslipidemia; continue statin; reviewed lipid profile; continue monitoring


#Prediabetes; newly diagnosed; hemoglobin A1c of 6.1%; management as above; 

continue monitoring


#GOPAL; most likely vasomotor nephropathy in the setting of submassive bilateral 

pulmonary emboli and RV strain; avoid nephrotoxic agents; continue monitoring


#Macrocytosis without anemia; to investigate as outpatient; continue monitoring


#Low TSH; free T4 WNL; continue monitoring


#Pulmonary hypertension; to follow up as outpatient; continue monitoring


#Physical deconditioning due to critical illness; continue physical therapy as 

inpatient; continue monitoring


#Discharge planning; 6 minute walk test was performed showing that the patient's

saturation continues to decrease while walking even on oxygen therapy; placed 

new order for  for home oxygen therapy; continue monitoring








Late Entry.





This medical document was created using an electronic medical record system with

computerized dictation system.  Although this document has been carefully 

reviewed, there might still be some phonetic and typographical errors.  These 

areas are purely typographical due to imperfections of the software programs, 

and do not reflect any compromise in the patient's medical care.


Plan discussed with:  Patient, Other (Nurse)


My Orders





                           Orders - ROBERTO RODNEY MD








Procedure Category Date Status





  Time 


 


Basic Metabolic Panel LAB 12/7/24 Logged





  04:00 


 


Complete Blood Count LAB 12/7/24 Logged





  04:00 


 


Communication Order ORDERS 12/7/24 Transmitted





  02:54 


 


*  CONS 12/7/24 Transmitted





Consult   


 


Communication Order ORDERS 12/7/24 Transmitted





  09:00 


 


Basic Metabolic Panel LAB 12/8/24 Verified





  04:00 


 


Complete Blood Count LAB 12/8/24 Verified





  04:00 











Date of Service:  Dec 7, 2024


Billing Provider:  ROBERTO RODNEY MD


Common Visit Codes:  [id number] INP/OBS CARE(HIGH)











ROBERTO RODNEY MD              Dec 7, 2024 03:10

## 2024-12-08 VITALS
TEMPERATURE: 97.8 F | DIASTOLIC BLOOD PRESSURE: 55 MMHG | RESPIRATION RATE: 18 BRPM | HEART RATE: 91 BPM | SYSTOLIC BLOOD PRESSURE: 104 MMHG | OXYGEN SATURATION: 93 %

## 2024-12-08 VITALS — HEART RATE: 85 BPM | RESPIRATION RATE: 18 BRPM | OXYGEN SATURATION: 99 %

## 2024-12-08 VITALS
HEART RATE: 89 BPM | SYSTOLIC BLOOD PRESSURE: 121 MMHG | DIASTOLIC BLOOD PRESSURE: 79 MMHG | TEMPERATURE: 98 F | RESPIRATION RATE: 20 BRPM | OXYGEN SATURATION: 93 %

## 2024-12-08 VITALS — OXYGEN SATURATION: 95 % | HEART RATE: 89 BPM | RESPIRATION RATE: 18 BRPM

## 2024-12-08 VITALS — HEART RATE: 74 BPM | RESPIRATION RATE: 16 BRPM | OXYGEN SATURATION: 94 %

## 2024-12-08 VITALS — RESPIRATION RATE: 18 BRPM | HEART RATE: 98 BPM | OXYGEN SATURATION: 93 %

## 2024-12-08 VITALS
RESPIRATION RATE: 20 BRPM | SYSTOLIC BLOOD PRESSURE: 115 MMHG | TEMPERATURE: 97.7 F | HEART RATE: 78 BPM | OXYGEN SATURATION: 91 % | DIASTOLIC BLOOD PRESSURE: 76 MMHG

## 2024-12-08 VITALS — HEART RATE: 83 BPM | OXYGEN SATURATION: 99 % | RESPIRATION RATE: 18 BRPM

## 2024-12-08 VITALS
RESPIRATION RATE: 18 BRPM | SYSTOLIC BLOOD PRESSURE: 130 MMHG | TEMPERATURE: 97.4 F | OXYGEN SATURATION: 95 % | HEART RATE: 84 BPM | DIASTOLIC BLOOD PRESSURE: 71 MMHG

## 2024-12-08 VITALS
RESPIRATION RATE: 18 BRPM | SYSTOLIC BLOOD PRESSURE: 115 MMHG | HEART RATE: 84 BPM | OXYGEN SATURATION: 96 % | TEMPERATURE: 98.3 F | DIASTOLIC BLOOD PRESSURE: 72 MMHG

## 2024-12-08 VITALS — OXYGEN SATURATION: 98 % | HEART RATE: 91 BPM | RESPIRATION RATE: 18 BRPM

## 2024-12-08 VITALS — HEART RATE: 88 BPM | OXYGEN SATURATION: 95 % | RESPIRATION RATE: 18 BRPM

## 2024-12-08 VITALS — RESPIRATION RATE: 22 BRPM | HEART RATE: 94 BPM | OXYGEN SATURATION: 94 %

## 2024-12-08 VITALS — RESPIRATION RATE: 18 BRPM | HEART RATE: 82 BPM | OXYGEN SATURATION: 99 %

## 2024-12-08 VITALS — HEART RATE: 96 BPM | OXYGEN SATURATION: 99 % | RESPIRATION RATE: 18 BRPM

## 2024-12-08 VITALS — HEART RATE: 74 BPM | RESPIRATION RATE: 16 BRPM | OXYGEN SATURATION: 99 %

## 2024-12-08 VITALS
DIASTOLIC BLOOD PRESSURE: 80 MMHG | HEART RATE: 83 BPM | SYSTOLIC BLOOD PRESSURE: 134 MMHG | RESPIRATION RATE: 16 BRPM | TEMPERATURE: 97.9 F | OXYGEN SATURATION: 95 %

## 2024-12-08 VITALS
HEART RATE: 91 BPM | OXYGEN SATURATION: 98 % | DIASTOLIC BLOOD PRESSURE: 18 MMHG | RESPIRATION RATE: 18 BRPM | SYSTOLIC BLOOD PRESSURE: 91 MMHG

## 2024-12-08 VITALS — HEART RATE: 86 BPM

## 2024-12-08 VITALS — RESPIRATION RATE: 18 BRPM | HEART RATE: 84 BPM | OXYGEN SATURATION: 96 %

## 2024-12-08 VITALS — HEART RATE: 86 BPM | OXYGEN SATURATION: 93 % | RESPIRATION RATE: 18 BRPM

## 2024-12-08 LAB
ANION GAP SERPL CALCULATED.3IONS-SCNC: 7 MMOL/L (ref 5–15)
BASE EXCESS BLDV CALC-SCNC: -0.7 MMOL/L (ref -2–3)
BUN SERPL-MCNC: 14 MG/DL (ref 9–23)
BUN/CREAT SERPL: 16.3 (ref 10–20)
CALCIUM SERPL-MCNC: 9.8 MG/DL (ref 8.7–10.4)
CHLORIDE SERPL-SCNC: 107 MMOL/L (ref 98–107)
CO2 SERPL-SCNC: 27 MMOL/L (ref 20–31)
GLUCOSE SERPL-MCNC: 98 MG/DL (ref 74–106)
HCT VFR BLD AUTO: 38 % (ref 36–46)
HGB BLD-MCNC: 13.1 G/DL (ref 12.2–16.2)
MCH RBC QN AUTO: 34.7 PG (ref 28–32)
MCV RBC AUTO: 100.9 FL (ref 80–100)
NRBC BLD QL AUTO: 0 %
POTASSIUM SERPL-SCNC: 4.3 MMOL/L (ref 3.5–5.1)
SODIUM SERPL-SCNC: 141 MMOL/L (ref 136–145)

## 2024-12-08 NOTE — MEDREC
[facility] ASP Intervention


Section I


[facility] ASP Intervention:  Review courses of therapy (DAY 12 ON AZITHROMYCIN - 

PLEASE CONSIDER D/C ANTIBIOTIC AS COURSE OF THERPAY COMPLETE)











MAZIN NUNEZ PHARMACIST        Dec 8, 2024 16:13

## 2024-12-08 NOTE — DVHPN2
Subjective


Almost no SOB and no chest tightness while at rest; developed SOB and 

palpitations during 6 minute walk test with PT associated with oxygen saturation

decreasing in 80s while on 4 to 5 L/min of oxygen therapy


Reviewed:  Care Plan, H&P, Labs, Medications, Previous Orders, Radiology, Other 

(Consultation)


Changes from previous H/P or p:  Changes





Objective


Vitals





Vital Signs








  Date Time  Temp Pulse Resp B/P (MAP) Pulse Ox O2 Delivery O2 Flow Rate FiO2


 


12/8/24 07:23  82 18  99   


 


12/8/24 07:17      Nasal Cannula* 4 36


 


12/8/24 05:00 97.7   115/76 (89)    





 97.7       








Intake/Output











                               Intake and Output 


 


 12/8/24





 07:00


 


Intake Total 1250 ml


 


Output Total 400 ml


 


Balance 850 ml


 


 


 


Intake Oral 1200 ml


 


IV Total 50 ml


 


Output Urine Total 400 ml


 


# Voids 2








General Appearance:  Alert, Oriented X3, Cooperative, No acute distress (While 

at rest; mild distress while walking)


HEENT:  Atraumatic


Lungs:  Clear to auscultation, Normal air movement


Cardiovascular:  Regular rate, Normal S1, Normal S2


Abdomen:  Normal bowel sounds, Soft, No tenderness


Neuro:  Normal speech, Cranial nerves 3-12 NL


Psych/Mental Status:  Mental status NL, Mood NL


Medications





                               Current Medications








 Medications  Dose


 Ordered  Sig/Brian


 Route  Start Time


 Stop Time Status Last Admin


Dose Admin


 


 Dextrose  50 ml  UD  PRN


 IV  11/26/24 18:15


     





 


 Aspirin  81 mg  DAILY


 PO  11/27/24 10:00


    12/8/24 08:36


81 MG


 


 Atorvastatin


 Calcium  40 mg  HS


 PO  11/26/24 22:00


    12/7/24 22:46


40 MG


 


 Acetaminophen  650 mg  Q6HP  PRN


 PO  11/26/24 18:15


    12/7/24 17:47


650 MG


 


 Docusate Sodium  100 mg  DAILY


 PO  11/27/24 10:00


    12/8/24 08:35


100 MG


 


 Ondansetron HCl  4 mg  Q4HP  PRN


 IV  11/26/24 18:15


     





 


 Nitroglycerin  0.4 mg  Q5MINP  PRN


 SL  11/26/24 18:15


     





 


 Albuterol  2.5 mg  Q4HR


 NEB  11/26/24 22:00


    12/8/24 07:17


2.5 MG


 


 Ceftriaxone Sodium  50 ml @ 


 100 mls/hr  DAILY@09


 IV  11/27/24 09:00


    12/8/24 08:36


100 MLS/HR


 


 Melatonin  5 mg  HS  PRN


 PO  11/29/24 22:00


    12/7/24 22:46


5 MG


 


 Apixaban  10 mg  BID


 PO  12/4/24 22:00


 12/11/24 21:59  12/8/24 08:35


10 MG


 


 Apixaban  5 mg  BID


 PO  12/11/24 22:00


     





 


 Azithromycin  500 mg  DAILY


 PO  12/5/24 10:00


    12/8/24 08:35


500 MG











Laboratory Results


Laboratory Tests


12/8/24 05:33











Chemistry








Test


 12/8/24


05:33


 


Calcium Level


 9.8 mg/dL


(8.7-10.4)








Urinalysis








Test


 12/2/24


10:00


 


Urine Color


 Yellow


(Yellow)


 


Urine Clarity Clear (Clear)  


 


Urine pH 7.0 (5.0-9.0)  


 


Urine Specific Gravity


 1.028


(1.001-1.035)


 


Urine Protein


 2+ (Negative)


H


 


Urine Ketones


 Negative


(Negative)


 


Urine Blood


 3+ /uL


(Negative)  H


 


Urine Nitrite


 Negative


(Negative)


 


Urine Bilirubin


 Negative


(Negative)


 


Urine Urobilinogen


 Normal mg/dL


(Negative)


 


Urine Leukocyte Esterase


 Trace /uL


(Negative)


 


Urine RBC


 997 /hpf (0 -


4)


 


Urine WBC


 14 /hpf (0 -


5)


 


Urine Squamous Epithelial


Cells Few /hpf (<5)  





 


Urine Bacteria


 None seen /hpf


(None Seen)


 


Urine Glucose


 Normal mg/dL


(Normal)








Microbiology





                                  Microbiology








 Date/Time


Source Procedure


Growth Status





 


 11/30/24 00:00


Nose MRSA Screen - Final


 Complete








Labs and/or images reviewed:  Labs reviewed by me, Image(s) reviewed by me





Assessment/Plan


Assessment/Plan


A 64-year-old female patient; with multiple comorbidities; who presented to 

emergency department with SOB and chest tightness.








#Acute hypoxic respiratory failure due to bilateral pulmonary emboli; reviewed 

available imaging studies and ABGs; continue oxygen therapy as needed; now on 2 

- 4 liters/minutes via nasal cannula at rest; was on BiPAP, Oxymizer, and high-

flow nasal cannula; the patient oxygen saturation decreased to 80s while walking

while on 4 to 5 L/min via nasal cannula and also was tachycardic and felt 

palpitations; continue monitoring


#Submassive bilateral pulmonary emboli with RV strain in the setting of history 

of pulmonary embolism 2009; causing SOB and chest tightness; stopped Xarelto in 

October 2024; was on heparin infusion; status post thrombectomy (clot retrieval 

of the right distal pulmonary artery as well as the right interlobar distal 

artery) on December 3, 2024 by cardiology; cardiology is following; continue 

apixaban; reviewed chest angiogram and echocardiogram; no DVTs; continue 

monitoring


#Chest tightness with NSTEMI type 2; demand ischemia; the setting of RV strain 

secondary to submassive bilateral pulmonary emboli; telemetry; continue aspirin 

and statin; cardiology is following; continue monitoring


#Essential hypertension; continue antihypertensive medications as indicated; 

continue monitoring


#Hyperglycemia; prediabetic; continue insulin sliding scale and hypoglycemia 

protocol; continue monitoring


#Asthma exacerbation; was on IV steroids; continue IV antibiotics; continue 

oxygen therapy as above; continue monitoring


#Metabolic syndrome with prediabetes and dyslipidemia along with morbid obesity;

counseled the patient on the importance of adopting healthy lifestyle with diet 

and exercise in order to lose weight; continue monitoring


#Morbid obesity; details and management as above; continue monitoring


#Dyslipidemia; continue statin; reviewed lipid profile; continue monitoring


#Prediabetes; newly diagnosed; hemoglobin A1c of 6.1%; management as above; 

continue monitoring


#GOPAL; most likely vasomotor nephropathy in the setting of submassive bilateral 

pulmonary emboli and RV strain; avoid nephrotoxic agents; continue monitoring


#Macrocytosis without anemia; to investigate as outpatient; continue monitoring


#Low TSH; free T4 WNL; continue monitoring


#Pulmonary hypertension; to follow up as outpatient; continue monitoring


#Physical deconditioning due to critical illness; continue physical therapy as 

inpatient; continue monitoring


#Discharge planning; continues to have tachycardia/palpitations upon walking 

with decreased oxygen saturation; ABGs ordered annual reviewed for home oxygen 

therapy;  team is following; order the new chest x-ray; continue 

monitoring





Unclear if the patient needs re-evaluation by Cardiology for any endovascular 

intervention; could continue monitoring until the patient finished initial 

treatment with apixaban 10 mg twice a day before making re-evaluation for oxygen

needs during exercise/exertion.





Late Entry.





This medical document was created using an electronic medical record system with

computerized dictation system.  Although this document has been carefully 

reviewed, there might still be some phonetic and typographical errors.  These 

areas are purely typographical due to imperfections of the software programs, 

and do not reflect any compromise in the patient's medical care.


Plan discussed with:  Patient, Other (Nurse)


My Orders





                           Orders - ROBERTO RODNEY MD








Procedure Category Date Status





  Time 


 


*  CONS 12/7/24 Transmitted





Consult   











Date of Service:  Dec 8, 2024


Billing Provider:  ROBERTO RODNEY MD


Common Visit Codes:  [id number] INP/OBS CARE(HIGH)











ROBERTO RODNEY MD              Dec 8, 2024 09:43

## 2024-12-09 VITALS — RESPIRATION RATE: 18 BRPM | OXYGEN SATURATION: 99 % | HEART RATE: 83 BPM

## 2024-12-09 VITALS — HEART RATE: 84 BPM | OXYGEN SATURATION: 98 % | RESPIRATION RATE: 18 BRPM

## 2024-12-09 VITALS
HEART RATE: 91 BPM | TEMPERATURE: 97.8 F | RESPIRATION RATE: 17 BRPM | OXYGEN SATURATION: 96 % | SYSTOLIC BLOOD PRESSURE: 132 MMHG | DIASTOLIC BLOOD PRESSURE: 76 MMHG

## 2024-12-09 VITALS
TEMPERATURE: 98 F | SYSTOLIC BLOOD PRESSURE: 136 MMHG | HEART RATE: 62 BPM | DIASTOLIC BLOOD PRESSURE: 75 MMHG | OXYGEN SATURATION: 94 % | RESPIRATION RATE: 17 BRPM

## 2024-12-09 VITALS — HEART RATE: 97 BPM | OXYGEN SATURATION: 87 % | RESPIRATION RATE: 20 BRPM

## 2024-12-09 VITALS
SYSTOLIC BLOOD PRESSURE: 116 MMHG | OXYGEN SATURATION: 95 % | DIASTOLIC BLOOD PRESSURE: 64 MMHG | HEART RATE: 93 BPM | TEMPERATURE: 98.2 F | RESPIRATION RATE: 16 BRPM

## 2024-12-09 VITALS — RESPIRATION RATE: 18 BRPM | HEART RATE: 89 BPM | OXYGEN SATURATION: 91 %

## 2024-12-09 VITALS — OXYGEN SATURATION: 97 % | RESPIRATION RATE: 16 BRPM | HEART RATE: 91 BPM

## 2024-12-09 VITALS — OXYGEN SATURATION: 92 % | RESPIRATION RATE: 18 BRPM | HEART RATE: 88 BPM

## 2024-12-09 VITALS — HEART RATE: 75 BPM | OXYGEN SATURATION: 97 % | RESPIRATION RATE: 18 BRPM

## 2024-12-09 VITALS — OXYGEN SATURATION: 92 %

## 2024-12-09 VITALS — HEART RATE: 90 BPM | OXYGEN SATURATION: 99 % | RESPIRATION RATE: 20 BRPM

## 2024-12-09 VITALS
HEART RATE: 88 BPM | SYSTOLIC BLOOD PRESSURE: 129 MMHG | TEMPERATURE: 98.3 F | DIASTOLIC BLOOD PRESSURE: 72 MMHG | OXYGEN SATURATION: 92 % | RESPIRATION RATE: 17 BRPM

## 2024-12-09 VITALS
OXYGEN SATURATION: 94 % | DIASTOLIC BLOOD PRESSURE: 76 MMHG | HEART RATE: 91 BPM | SYSTOLIC BLOOD PRESSURE: 145 MMHG | TEMPERATURE: 98 F | RESPIRATION RATE: 18 BRPM

## 2024-12-09 VITALS — HEART RATE: 88 BPM | RESPIRATION RATE: 18 BRPM | OXYGEN SATURATION: 97 %

## 2024-12-09 VITALS — RESPIRATION RATE: 16 BRPM | OXYGEN SATURATION: 93 % | HEART RATE: 84 BPM

## 2024-12-09 VITALS
OXYGEN SATURATION: 91 % | HEART RATE: 89 BPM | TEMPERATURE: 98 F | SYSTOLIC BLOOD PRESSURE: 122 MMHG | RESPIRATION RATE: 19 BRPM | DIASTOLIC BLOOD PRESSURE: 72 MMHG

## 2024-12-09 VITALS — HEART RATE: 88 BPM | OXYGEN SATURATION: 88 % | RESPIRATION RATE: 18 BRPM

## 2024-12-09 VITALS — RESPIRATION RATE: 18 BRPM | OXYGEN SATURATION: 93 % | HEART RATE: 86 BPM

## 2024-12-09 VITALS — HEART RATE: 75 BPM

## 2024-12-09 LAB
ALBUMIN SERPL-MCNC: 3.5 G/DL (ref 3.2–4.8)
ALP SERPL-CCNC: 53 U/L (ref 46–116)
ALT SERPL-CCNC: 25 U/L (ref 7–40)
ANION GAP SERPL CALCULATED.3IONS-SCNC: 6 MMOL/L (ref 5–15)
BILIRUB SERPL-MCNC: 0.6 MG/DL (ref 0.2–1)
BUN SERPL-MCNC: 13 MG/DL (ref 9–23)
BUN/CREAT SERPL: 14.4 (ref 10–20)
CALCIUM SERPL-MCNC: 9.8 MG/DL (ref 8.7–10.4)
CHLORIDE SERPL-SCNC: 109 MMOL/L (ref 98–107)
CO2 SERPL-SCNC: 27 MMOL/L (ref 20–31)
GLUCOSE SERPL-MCNC: 96 MG/DL (ref 74–106)
HCT VFR BLD AUTO: 37.3 % (ref 36–46)
HGB BLD-MCNC: 12.6 G/DL (ref 12.2–16.2)
MCH RBC QN AUTO: 34.3 PG (ref 28–32)
MCV RBC AUTO: 101.8 FL (ref 80–100)
NRBC BLD QL AUTO: 0 %
POTASSIUM SERPL-SCNC: 4.4 MMOL/L (ref 3.5–5.1)
PROT SERPL-MCNC: 5.6 G/DL (ref 5.7–8.2)
SODIUM SERPL-SCNC: 142 MMOL/L (ref 136–145)

## 2024-12-09 RX ADMIN — FUROSEMIDE ONE MG: 10 INJECTION, SOLUTION INTRAMUSCULAR; INTRAVENOUS at 10:15

## 2024-12-09 RX ADMIN — POTASSIUM CHLORIDE ONE MEQ: 1500 TABLET, EXTENDED RELEASE ORAL at 10:15

## 2024-12-09 NOTE — DVHPN2
Progress Note


Date Seen:  Dec 9, 2024


Medical Necessity Reason


Pt with a Central, PICC or Fol:  No





Subjective


Patient reports:  No new complaints


Review of Systems:  HEENT:Normal, CVS:Normal, RESPIRATORY:Normal, GI:Normal, 

:Normal, MSK:Normal, NEURO:Normal





Objective


vital signs





                                   Vital Sign








  Date Time  Temp Pulse Resp B/P (MAP) Pulse Ox O2 Delivery O2 Flow Rate FiO2


 


12/9/24 10:04  84 18  98   


 


12/9/24 09:00 98.2   116/64 (81)    





 98.2       


 


12/9/24 07:45      Nasal Cannula* 4 N/A





      Oxymizer  














                           Total Intake and Output   


 


 12/8/24 12/8/24 12/9/24





 15:00 23:00 07:00


 


Intake Total  900 ml 240 ml


 


Output Total  1 ml 


 


Balance  899 ml 240 ml








medications





                               Current Medications








 Medications  Dose


 Ordered  Sig/Brian


 Route  Start Time


 Stop Time Status Last Admin


Dose Admin


 


 Dextrose  50 ml  UD  PRN


 IV  11/26/24 18:15


     





 


 Aspirin  81 mg  DAILY


 PO  11/27/24 10:00


    12/9/24 09:54


81 MG


 


 Atorvastatin


 Calcium  40 mg  HS


 PO  11/26/24 22:00


    12/8/24 21:49


40 MG


 


 Acetaminophen  650 mg  Q6HP  PRN


 PO  11/26/24 18:15


    12/7/24 17:47


650 MG


 


 Docusate Sodium  100 mg  DAILY


 PO  11/27/24 10:00


    12/9/24 09:54


100 MG


 


 Ondansetron HCl  4 mg  Q4HP  PRN


 IV  11/26/24 18:15


     





 


 Nitroglycerin  0.4 mg  Q5MINP  PRN


 SL  11/26/24 18:15


     





 


 Albuterol  2.5 mg  Q4HR


 NEB  11/26/24 22:00


    12/9/24 09:54


2.5 MG


 


 Melatonin  5 mg  HS  PRN


 PO  11/29/24 22:00


    12/8/24 21:49


5 MG


 


 Apixaban  10 mg  BID


 PO  12/4/24 22:00


 12/11/24 21:59  12/9/24 09:54


10 MG


 


 Apixaban  5 mg  BID


 PO  12/11/24 22:00


     





 


 Azithromycin  500 mg  DAILY


 PO  12/5/24 10:00


    12/9/24 09:54


500 MG








Examination:  GENERAL:Normal, HEENT:Normal, NECK:Normal, LUNGS:Normal, 

LUNGS:Abnormal (on oxygen), CVS:Normal, ABDOMEN:Normal, MSK:Normal, SKIN:Normal,

NEURO:Normal, :Normal


laboratory and microbiology


                                Laboratory Tests


12/9/24 05:56

















Test


 12/9/24


05:56 Range/Units


 


 


Serum Glucose 96    mg/dL








                                  Microbiology








 Date/Time


Source Procedure


Growth Status





 


 11/30/24 00:00


Nose MRSA Screen - Final


 Complete











Problem List/Assessment/Plan


Problem List/Assessment/Plan


#1 acute resp failure: on oxymizer


#2 acute PE: on heparin drip, s/p thrombectomy , start eliquis


#3 asthma


#4 htn


#5 obesity


#6 nstemi ?type 2


#7 acute diastolic heart failure likely due to pe/rv strain


#8 ?pneumonia: on antibiotics


#9 acute renal failure ?vasomotor nephropathy





advance care planning- full code- time spent 19mins


Plan discussed with:  Patient





Dietary Evaluation Review


Comments:  


Continue current plan of care


Expected Outcomes/Goals:  


F/U in 3-5 days





Date of Service:  Dec 9, 2024


Billing Provider:  JIMI GRACE MD


Common Visit Codes:  [id number] INP/OBS CARE(HIGH)











JIMI GRACE MD          Dec 9, 2024 10:19

## 2024-12-09 NOTE — DVH
CHEST RADIOGRAPH



Indication: follow up for respiratory failure



Technique: 



 Single AP portable chest radiograph was obtained.



Comparison: XY CHEST XRAY 1 VIEW on DOS: 12/2/24, XY CHEST PORTABLE on DOS: 12/1/24, XY CHEST PORTABL
E on DOS: 11/30/24, XY CHEST PORTABLE on DOS: 11/28/24, XY CHEST PORTABLE on DOS: 11/26/24, XY CHEST 
XRAY 1 VIEW on DOS: 12/2/24



FINDINGS: 



cardiomegaly and prominence of the pulmonary vasculature, similar to the prior exam.  No focal consol
idation. No other significant interval change.



IMPRESSION: 



1. Cardiomegaly and prominence of the pulmonary vasculature May suggest a degree of pulmonary vascula
r congestion in the appropriate clinical setting.



2. No focal consolidation. 



Electronically Signed by: Gonzalez Gao at 12/09/2024 06:52:43 AM

## 2024-12-10 VITALS — HEART RATE: 69 BPM | RESPIRATION RATE: 20 BRPM | OXYGEN SATURATION: 100 %

## 2024-12-10 VITALS
DIASTOLIC BLOOD PRESSURE: 69 MMHG | HEART RATE: 81 BPM | OXYGEN SATURATION: 92 % | RESPIRATION RATE: 18 BRPM | SYSTOLIC BLOOD PRESSURE: 115 MMHG | TEMPERATURE: 98.5 F

## 2024-12-10 VITALS
DIASTOLIC BLOOD PRESSURE: 67 MMHG | SYSTOLIC BLOOD PRESSURE: 110 MMHG | RESPIRATION RATE: 17 BRPM | HEART RATE: 74 BPM | OXYGEN SATURATION: 96 % | TEMPERATURE: 97.8 F

## 2024-12-10 VITALS
HEART RATE: 91 BPM | SYSTOLIC BLOOD PRESSURE: 121 MMHG | RESPIRATION RATE: 18 BRPM | TEMPERATURE: 98.2 F | DIASTOLIC BLOOD PRESSURE: 78 MMHG | OXYGEN SATURATION: 95 %

## 2024-12-10 VITALS — RESPIRATION RATE: 18 BRPM | OXYGEN SATURATION: 95 % | HEART RATE: 85 BPM

## 2024-12-10 VITALS — HEART RATE: 87 BPM | RESPIRATION RATE: 18 BRPM | OXYGEN SATURATION: 93 %

## 2024-12-10 VITALS — OXYGEN SATURATION: 98 % | RESPIRATION RATE: 20 BRPM | HEART RATE: 86 BPM

## 2024-12-10 VITALS
OXYGEN SATURATION: 90 % | RESPIRATION RATE: 16 BRPM | TEMPERATURE: 97.6 F | DIASTOLIC BLOOD PRESSURE: 66 MMHG | SYSTOLIC BLOOD PRESSURE: 125 MMHG | HEART RATE: 85 BPM

## 2024-12-10 VITALS — OXYGEN SATURATION: 97 % | HEART RATE: 78 BPM | RESPIRATION RATE: 20 BRPM

## 2024-12-10 VITALS — RESPIRATION RATE: 18 BRPM | OXYGEN SATURATION: 94 % | HEART RATE: 84 BPM

## 2024-12-10 VITALS — HEART RATE: 70 BPM | OXYGEN SATURATION: 97 % | RESPIRATION RATE: 16 BRPM

## 2024-12-10 VITALS — HEART RATE: 79 BPM

## 2024-12-10 VITALS — OXYGEN SATURATION: 100 % | HEART RATE: 83 BPM | RESPIRATION RATE: 20 BRPM

## 2024-12-10 NOTE — DVHDS
HISTORY OF PRESENT ILLNESS:  The patient is a 64-year-old lady who was admitted 

with history of shortness of breath and low oxygen saturation and was placed on 

nonrebreather mask.  She has history of asthma and hypertension.



HOSPITAL COURSE:  The patient had a CT angiography that showed evidence of 

bilateral pulmonary emboli, greater on the right lung.  The patient had a head 

CT that was negative for any intracranial pathology.  The patient was placed on 

anticoagulation.  The patient was eventually weaned down on her oxygen.  The 

patient was seen in Cardiology consult by Dr. Moss.  Echocardiogram done 

showed ejection fraction of 55%.  The patient underwent pulmonary angiogram and 

an attempt was made to retrieve the clot, which appeared to be more chronic.  

The patient will now be discharged home.  Her ABG on room air prior to discharge

showed a pH of 7.4, pCO2 of 33 and a pO2 of 44.  The patient will be discharged 

home to resume her home medications as well as to be on Eliquis 5 mg b.i.d. 

along with home oxygen at 3-4 liters per minute.  She will follow up with her 

primary in 1 week.



FINAL DIAGNOSES:

1.  Acute respiratory failure.

2.  Acute pulmonary embolism, status post thrombectomy.

3.  Asthma.

4.  Hypertension.

5.  Obesity.

6.  Non-STEMI, likely type 2.

7.  Acute diastolic heart failure.

8.  Questionable pneumonia.

9.  Acute renal failure, questionable vasomotor nephropathy.



Time spent in discharge planning and review of plan with the patient and nursing

was 39 minutes.





MD LOKESH Brito/MARKOS



DD: 12/10/2024 10:28 AM

DT: 12/10/2024 10:43 AM

TID: [id number] RECEIPT: [id number]

## 2024-12-10 NOTE — DVHDS2
Discharge Summary


Date of Admission


Nov 26, 2024 at 18:11





Date of Discharge:


Dec 10, 2024





Labs/Diagnostic Data:





                               Laboratory Results








Test


 12/9/24


05:56 12/8/24


10:40 12/7/24


12:47 12/4/24


04:49


 


White Blood Count


 8.6 10^3/uL


(4.4-10.8) 


 


 





 


Red Blood Count


 3.66 10^6/uL


(4.0-5.20) 


 


 





 


Hemoglobin


 12.6 g/dL


(12.2-16.2) 


 


 





 


Hematocrit


 37.3 %


(36.0-46.0) 


 


 





 


Mean Corpuscular Volume


 101.8 fL


(80.0-100.0) 


 


 





 


Mean Corpuscular Hemoglobin


 34.3 pg


(28.0-32.0) 


 


 





 


Mean Corpuscular Hemoglobin


Concent 33.7 g/dL


(32.0-36.0) 


 


 





 


Red Cell Distribution Width


 14.9 %


(11.8-14.3) 


 


 





 


Platelet Count


 224 10^3/uL


(140-450) 


 


 





 


Mean Platelet Volume


 9.3 fL


(6.9-10.8) 


 


 





 


Neutrophils (%) (Auto)


 68.1 %


(37.0-80.0) 


 


 





 


Lymphocytes (%) (Auto)


 16.1 %


(10.0-50.0) 


 


 





 


Monocytes (%) (Auto)


 12.0 %


(0.0-12.0) 


 


 





 


Eosinophils (%) (Auto)


 2.7 %


(0.0-7.0) 


 


 





 


Basophils (%) (Auto)


 1.1 %


(0.0-2.0) 


 


 





 


Neutrophils # (Auto)


 5.9 10 ^3/uL


(1.6-8.6) 


 


 





 


Lymphocytes # (Auto)


 1.4 10 ^3/uL


(0.4-5.4) 


 


 





 


Monocytes # (Auto)


 1.0 10 ^3/uL


(0-1.3) 


 


 





 


Eosinophils # (Auto)


 0.2 10 ^3/uL


(0-0.8) 


 


 





 


Basophils # (Auto)


 0.1 10 ^3/uL


(0-0.2) 


 


 





 


Nucleated Red Blood Cells 0.0 %    


 


Sodium Level


 142 mmol/L


(136-145) 


 


 





 


Potassium Level


 4.4 mmol/L


(3.5-5.1) 


 


 





 


Chloride Level


 109 mmol/L


() 


 


 





 


Carbon Dioxide Level


 27 mmol/L


(20-31) 


 


 





 


Anion Gap 6 (5-15)    


 


Blood Urea Nitrogen


 13 mg/dL


(9-23) 


 


 





 


Creatinine


 0.90 mg/dL


(0.550-1.02) 


 


 





 


Glomerular Filtration Rate


Calc 71 mL/min


(>90) 


 


 





 


BUN/Creatinine Ratio


 14.4


(10.0-20.0) 


 


 





 


Serum Glucose


 96 mg/dL


() 


 


 





 


Calcium Level


 9.8 mg/dL


(8.7-10.4) 


 


 





 


Total Bilirubin


 0.6 mg/dL


(0.2-1.0) 


 


 





 


Aspartate Amino Transferase


(AST) 18 U/L (13-40) 


 


 


 





 


Alanine Aminotransferase (ALT) 25 U/L (7-40)    


 


Alkaline Phosphatase


 53 U/L


() 


 


 





 


Total Protein


 5.6 g/dL


(5.7-8.2) 


 


 





 


Albumin


 3.5 g/dL


(3.2-4.8) 


 


 





 


Blood Gas Specimen Type  Arterial   


 


Blood Gas Sample Site  Right radial   


 


Blood Gas Patient Temperature  37.0   


 


Arterial Blood Date Drawn  [id number]   


 


Arterial Blood pH


 


 7.449


(7.350-7.450) 


 





 


Arterial Blood Partial


Pressure CO2 


 33.2 mmHg


(32.0-45.0) 


 





 


Arterial Blood Partial


Pressure O2 


 44.2 mmHg


(83.0-108.0) 


 





 


Arterial Blood HCO3


 


 22.5 mmol/L


(21.0-28.0) 


 





 


Arterial Blood Oxygen


Saturation 


 79.0 %


(94.0-98.0) 


 





 


Arterial Blood Base Excess


 


 -0.7 mmol/L


(-2.0-3.0) 


 





 


Arterial Blood Oxyhemoglobin


 


 78.1 %


(94.0-98.0) 


 





 


Arterial Blood


Carboxyhemoglobin 


 0.9 %


(0.5-1.5) 


 





 


Arterial Blood Methemoglobin


 


 0.2 %


(0.0-1.5) 


 





 


Erick Test  Yes   


 


Blood Gas Total Hemoglobin


 


 14.40 g/dL


(12.0-16.0) 


 





 


Blood Gas Modality  Room air   


 


FiO2 %  21.0   


 


Blood Gas Critical Value Read


Back 


 Yes 


 


 





 


Blood Gas Notified Whom


 


 brent Alston md 


 





 


Blood Gas Notified Time  [id number]   


 


Blood Gas Notified By  RENATE ibanez rrt   


 


POC Glucose


 


 


 101 mg/dl


() 





 


Prothrombin Time


 


 


 


 11.1 sec


(9.3-11.8)


 


Prothrombin Time INR


 


 


 


 1.05


(0.9-1.15)


 


Activated Partial


Thromboplast Time 


 


 


 77.7 SEC


(24.5-34.5)


 


Test


 12/3/24


11:50 12/2/24


10:00 12/2/24


08:30 12/2/24


05:21


 


Activated Clotting Time


 216 SEC.


() 


 


 





 


Urine Color


 


 Yellow


(Yellow) 


 





 


Urine Clarity  Clear (Clear)   


 


Urine pH  7.0 (5.0-9.0)   


 


Urine Specific Gravity


 


 1.028


(1.001-1.035) 


 





 


Urine Protein  2+ (Negative)   


 


Urine Ketones


 


 Negative


(Negative) 


 





 


Urine Blood


 


 3+ /uL


(Negative) 


 





 


Urine Nitrite


 


 Negative


(Negative) 


 





 


Urine Bilirubin


 


 Negative


(Negative) 


 





 


Urine Urobilinogen


 


 Normal mg/dL


(Negative) 


 





 


Urine Leukocyte Esterase


 


 Trace /uL


(Negative) 


 





 


Urine RBC


 


 997 /hpf (0 -


4) 


 





 


Urine WBC


 


 14 /hpf (0 -


5) 


 





 


Urine Squamous Epithelial


Cells 


 Few /hpf (<5) 


 


 





 


Urine Bacteria


 


 None seen /hpf


(None Seen) 


 





 


Urine Glucose


 


 Normal mg/dL


(Normal) 


 





 


Blood Gas Liter Flow   50.00  


 


Magnesium Level


 


 


 


 1.8 mg/dL


(1.6-2.6)


 


Test


 11/28/24


02:44 11/27/24


10:49 11/27/24


10:15 11/27/24


08:55


 


Free Thyroxine (T4) Calculated


 1.06 ng/dL


(0.89-1.76) 


 


 





 


Differential Total Cells


Counted 


 100.0 (100) 


 


 





 


Neutrophils % (Manual)  78 (37.0-80.0)   


 


Band Neutrophils % (Manual)  0   


 


Lymphocytes % (Manual)  14 (10.0-50.0)   


 


Monocytes % (Manual)  8 (0-12)   


 


Eosinophils % (Manual)  0 (0-7)   


 


Basophils % (Manual)  0 (0.0-2.0)   


 


Metamyelocytes % (manual)  0   


 


Myelocytes % (Manual)  0   


 


Promyelocytes % (Manual)  0   


 


Blast Cells % (Manual)  0   


 


Reactive Lymphocytes  0   


 


Platelet Estimate  Adequate   


 


Influenza Type A Antigen


 


 


 Negative


(Negative) 





 


Influenza Type B Antigen


 


 


 Negative


(Negative) 





 


SARS-CoV-2 Antigen (Rapid)


 


 


 Negative


(NEGATIVE) 





 


Troponin I High Sensitivity


 


 


 


 533 ng/L


(</=34)


 


Test


 11/27/24


03:51 11/26/24


16:21 


 





 


Hemoglobin A1c


 6.1 % A1C


(<5.7) 


 


 





 


B-Type Natriuretic Peptide


 747.58 pg/mL


(0-100) 


 


 





 


Triglycerides Level


 96 mg/dL (<


150) 


 


 





 


Cholesterol Level


 195 mg/dL (<


200) 


 


 





 


LDL Cholesterol


 95 mg/dL (<


100) 


 


 





 


HDL Cholesterol


 80 mg/dL


(40-59) 


 


 





 


Thyroid Stimulating Hormone


(TSH) 0.42 uIU/mL


(0.55-4.78) 


 


 





 


Venous Blood pH


 


 7.359


(7.320-7.430) 


 





 


Venous Blood pCO2 at Patient


Temp 


 47.9 mmHg


(38.0-54.0) 


 





 


Venous Blood pO2 at Patient


Temp 


 54.8 mmHg


(23.0-48.0) 


 





 


Venous Blood HCO3


 


 26.4 mmol/L


(22.0-29.0) 


 





 


Venous Blood Base Excess


 


 0.3 mmol/L


(-2.0-3.0) 


 





 


Blood Gas Set Respiration Rate  12.0   


 


Blood Gas Pressure Support  7   


 


Blood Gas EPAP  5   


 


Blood Gas IPAP  12   





                             Other Laboratory Tests


12/9/24 05:56











Brief Hx & Hospital Course:


see dictated note





Condition at Discharge:


Fair





Final Diagnosis/Problems List





pe





Discharge Disposition:


Home





Discharge Instruct/Medications


Diet:  Cardiac 2g Na,low cholest


Activity:  No Restrictions, As Tolerated


Follow Up/Referral:  


fu with pcp in 1 wk


Medications:  


resume home meds





script to pharmacy


Discharge Statement:


"Patient was advised to return to the ER or call 911 if any headaches, 

dizziness, shortness of breath, chest pain, abdominal pain, bleeding, fevers, or

worsening of medical condition.





Patient was counseled about treatment plan, medications, possible side effects, 

patientverbalized understanding. All questions were answered to the best of my 

ability.





This discharge took greater then 30 minutes in planning, reviewing 

documentation, counseling the patient, and discussing with other team members."





ASSESSMENT


pe





Date of Service:  Dec 10, 2024


Billing Provider:  JIMI GRACE MD


Common Visit Codes:  22415-RER/OBS DISCH DAY >30min











JIMI GRACE MD         Dec 10, 2024 10:24